# Patient Record
Sex: FEMALE | Race: WHITE | Employment: OTHER | URBAN - NONMETROPOLITAN AREA
[De-identification: names, ages, dates, MRNs, and addresses within clinical notes are randomized per-mention and may not be internally consistent; named-entity substitution may affect disease eponyms.]

---

## 2017-03-02 ENCOUNTER — HOSPITAL ENCOUNTER (EMERGENCY)
Facility: HOSPITAL | Age: 67
Discharge: HOME | End: 2017-03-02
Attending: FAMILY MEDICINE | Admitting: FAMILY MEDICINE
Payer: COMMERCIAL

## 2017-03-02 VITALS
SYSTOLIC BLOOD PRESSURE: 124 MMHG | HEART RATE: 62 BPM | TEMPERATURE: 97.5 F | DIASTOLIC BLOOD PRESSURE: 81 MMHG | OXYGEN SATURATION: 95 % | RESPIRATION RATE: 16 BRPM

## 2017-03-02 DIAGNOSIS — S73.101A HIP SPRAIN, RIGHT, INITIAL ENCOUNTER: ICD-10-CM

## 2017-03-02 PROCEDURE — 99283 EMERGENCY DEPT VISIT LOW MDM: CPT

## 2017-03-02 PROCEDURE — 73502 X-RAY EXAM HIP UNI 2-3 VIEWS: CPT | Mod: TC,RT

## 2017-03-02 PROCEDURE — 99283 EMERGENCY DEPT VISIT LOW MDM: CPT | Performed by: FAMILY MEDICINE

## 2017-03-02 NOTE — ED PROVIDER NOTES
History     Chief Complaint   Patient presents with     Hip Pain     right hip     HPI  Nishi Sanotyo is a 66 year old female who was getting ready for the day and twisted on her right hip.  She felt a popping sensation and the hip became painful immediately, she had increased difficulty with ambulation.  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  She has PCA assistance currently.    I have reviewed the Medications, Allergies, Past Medical and Surgical History, and Social History in the Epic system.    Review of Systems   Constitutional: Positive for activity change. Negative for fatigue.   HENT: Negative.         Did not fall or injure anything but hip   Respiratory: Negative for shortness of breath.    Cardiovascular: Negative for chest pain.   Gastrointestinal: Negative for abdominal distention and abdominal pain.   Genitourinary: Negative.    Musculoskeletal: Positive for arthralgias.        See HPI.   Skin: Negative for color change and wound.   Psychiatric/Behavioral: Negative.        Physical Exam   BP: 127/87  Pulse: 62  Heart Rate: 61  Temp: 98.7  F (37.1  C)  Resp: 18  SpO2: 96 %  Physical Exam   Constitutional: She is oriented to person, place, and time. She appears well-developed and well-nourished. No distress.   HENT:   Head: Normocephalic and atraumatic.   Neck: Normal range of motion. Neck supple.   Cardiovascular: Normal rate, regular rhythm, normal heart sounds and intact distal pulses.    No murmur heard.  Pulmonary/Chest: Effort normal and breath sounds normal. No respiratory distress.   Abdominal: Soft. Bowel sounds are normal. She exhibits no distension. There is no tenderness.   Musculoskeletal: Normal range of motion. She exhibits tenderness. She exhibits no edema or deformity.   Pain in groin area on right side.   Neurological: She is alert and oriented to person, place, and  time.   Skin: Skin is warm and dry.   Psychiatric: She has a normal mood and affect.   Nursing note and vitals reviewed.      ED Course     ED Course     Procedures      Labs Ordered and Resulted from Time of ED Arrival Up to the Time of Departure from the ED - No data to display    Assessments & Plan (with Medical Decision Making)   Patient has pain in groin, but no evidence of fracture on xray.  Ambulated in ED, has an unsteady gait usually due to her RA, and has walker at home.  Recommended she use it until the pain improves.  She stated she has pain meds at home, does not need anything else.  Follow up with Dr. Heart as needed.    I have reviewed the nursing notes.    I have reviewed the findings, diagnosis, plan and need for follow up with the patient.    There are no discharge medications for this patient.      Final diagnoses:   Hip sprain, right, initial encounter       3/2/2017   [facility]     Monique Lala MD  03/03/17 3830

## 2017-03-02 NOTE — ED NOTES
discharge instructions reviewed with patient. Encouraged to return with new or worsening symptoms.  No questions or concerns. Taxi called for patient to transport home

## 2017-03-02 NOTE — ED NOTES
"Pt presents in [facility] ambulance with complaint of right sided hip pain that began this morning. The patient was taking a step with her walker when she felt it \"pop.\" She states she could not move from there. External rotation noted. Patient rates the pain 10/10 with any movement, but does not have pain at rest. Patient does have history of RA and reports having many surgeries. Call light within reach.    "

## 2017-03-02 NOTE — ED AVS SNAPSHOT
[facility]    [address] [address]    Phone:  [phone]                                       Nishi Santoyo   MRN: [id number]    Department:  [facility]   Date of Visit:  3/2/2017           Patient Information     Date Of Birth          1950        Your diagnoses for this visit were:     Hip sprain, right, initial encounter        You were seen by Monique Lala MD.      Follow-up Information     Follow up with Hua Heart DO In 4 days.    Specialty:  Family Practice    Why:  As needed, If symptoms worsen or fail to improve    Contact information:    [facility]  [address] [facility] [id number] [phone]          Discharge Instructions         Hip Strain    You have a strain of the muscles around the hip joint. A muscle strain is a stretching or tearing of muscle fibers. This causes pain, especially when you move that muscle. There may also be some swelling and bruising.  Home care    Stay off the injured leg as much as possible until you can walk on it without pain. If you have a lot of pain with walking, crutches or a walker may be prescribed. These can be rented or purchased at many pharmacies and surgical or orthopedic supply stores. Follow your healthcare provider's advice regarding when to begin putting weight on that leg.    Apply an ice pack over the injured area for 15 to 20 minutes every 3 to 6 hours. You should do this for the first 24 to 48 hours. You can make an ice pack by filling a plastic bag that seals at the top with ice cubes and then wrapping it with a thin towel. Be careful not to injure your skin with the ice treatments. Ice should never be applied directly to skin. Continue the use of ice packs for relief of pain and swelling as needed. After 48 hours, apply heat (warm shower or warm bath) for 15 to 20 minutes several times a day, or alternate ice and heat.    You may use over-the-counter  pain medicine to control pain, unless another pain medicine was prescribed. If you have chronic liver or kidney disease or ever had a stomach ulcer or GI bleeding, talk with your healthcare provider before using these medicines.    If you play sports, you may resume these activities when you are able to hop and run on the injured leg without pain.  Follow-up care  Follow up with your healthcare provider, or as advised. If your symptoms do not begin to get better after a week, more tests may be needed.  If X-rays were taken, you will be told of any new findings that may affect your care.  When to seek medical advice  Call your healthcare provider right away if any of these occur:    Increased swelling or  bruising    Increased pain    Losing the ability to put weight on the injured side    0398-5486 [employer]. [address] [place] [address]. All rights reserved. This information is not intended as a substitute for professional medical care. Always follow your healthcare professional's instructions.             Review of your medicines      Notice     You have not been prescribed any medications.            Procedures and tests performed during your visit     XR Hip Right 2-3 Views      Orders Needing Specimen Collection     None      Pending Results     Date and Time Order Name Status Description    3/2/2017 1110 XR Hip Right 2-3 Views Preliminary             Pending Culture Results     No orders found from 2/28/2017 to 3/3/2017.            Thank you for choosing [facility]       Thank you for choosing [facility] for your care. Our goal is always to provide you with excellent care. Hearing back from our patients is one way we can continue to improve our services. Please take a few minutes to complete the written survey that you may receive in the mail after you visit with us. Thank you!        [employer] Information     [employer] lets you send messages to your doctor, view your test results, renew  "your prescriptions, schedule appointments and more. To sign up, go to [email or web address] . Click on \"Log in\" on the left side of the screen, which will take you to the Welcome page. Then click on \"Sign up Now\" on the right side of the page.     You will be asked to enter the access code listed below, as well as some personal information. Please follow the directions to create your username and password.     Your access code is: [email or web address]  Expires: 2017  1:11 PM     Your access code will  in 90 days. If you need help or a new code, please call your [place] clinic or [phone].        Care EveryWhere ID     This is your Care EveryWhere ID. This could be used by other organizations to access your [place] medical records  [id number]        After Visit Summary       This is your record. Keep this with you and show to your community pharmacist(s) and doctor(s) at your next visit.                  "

## 2017-03-02 NOTE — DISCHARGE INSTRUCTIONS
Hip Strain    You have a strain of the muscles around the hip joint. A muscle strain is a stretching or tearing of muscle fibers. This causes pain, especially when you move that muscle. There may also be some swelling and bruising.  Home care    Stay off the injured leg as much as possible until you can walk on it without pain. If you have a lot of pain with walking, crutches or a walker may be prescribed. These can be rented or purchased at many pharmacies and surgical or orthopedic supply stores. Follow your healthcare provider's advice regarding when to begin putting weight on that leg.    Apply an ice pack over the injured area for 15 to 20 minutes every 3 to 6 hours. You should do this for the first 24 to 48 hours. You can make an ice pack by filling a plastic bag that seals at the top with ice cubes and then wrapping it with a thin towel. Be careful not to injure your skin with the ice treatments. Ice should never be applied directly to skin. Continue the use of ice packs for relief of pain and swelling as needed. After 48 hours, apply heat (warm shower or warm bath) for 15 to 20 minutes several times a day, or alternate ice and heat.    You may use over-the-counter pain medicine to control pain, unless another pain medicine was prescribed. If you have chronic liver or kidney disease or ever had a stomach ulcer or GI bleeding, talk with your healthcare provider before using these medicines.    If you play sports, you may resume these activities when you are able to hop and run on the injured leg without pain.  Follow-up care  Follow up with your healthcare provider, or as advised. If your symptoms do not begin to get better after a week, more tests may be needed.  If X-rays were taken, you will be told of any new findings that may affect your care.  When to seek medical advice  Call your healthcare provider right away if any of these occur:    Increased swelling or  bruising    Increased pain    Losing the  ability to put weight on the injured side    1605-0636 [employer]. [address] [place] [address], [place] [address]. All rights reserved. This information is not intended as a substitute for professional medical care. Always follow your healthcare professional's instructions.

## 2017-03-02 NOTE — ED AVS SNAPSHOT
[facility]    [address] [place] [address]    Phone:  [phone]                                       Nishi Santoyo   MRN: [id number]    Department:  [facility]   Date of Visit:  3/2/2017           After Visit Summary Signature Page     I have received my discharge instructions, and my questions have been answered. I have discussed any challenges I see with this plan with the nurse or doctor.    ..........................................................................................................................................  Patient/Patient Representative Signature      ..........................................................................................................................................  Patient Representative Print Name and Relationship to Patient    ..................................................               ................................................  Date                                            Time    ..........................................................................................................................................  Reviewed by Signature/Title    ...................................................              ..............................................  Date                                                            Time

## 2017-03-03 ASSESSMENT — ENCOUNTER SYMPTOMS
ARTHRALGIAS: 1
SHORTNESS OF BREATH: 0
COLOR CHANGE: 0
ABDOMINAL DISTENTION: 0
PSYCHIATRIC NEGATIVE: 1
ACTIVITY CHANGE: 1
FATIGUE: 0
WOUND: 0
ABDOMINAL PAIN: 0

## 2017-06-15 DIAGNOSIS — Z12.31 VISIT FOR SCREENING MAMMOGRAM: Primary | ICD-10-CM

## 2018-03-16 ENCOUNTER — HOSPITAL ENCOUNTER (OUTPATIENT)
Dept: BONE DENSITY | Facility: HOSPITAL | Age: 68
End: 2018-03-16
Attending: FAMILY MEDICINE
Payer: COMMERCIAL

## 2018-03-16 DIAGNOSIS — M81.0 POSTMENOPAUSAL OSTEOPOROSIS: ICD-10-CM

## 2018-03-16 PROCEDURE — 77081 DXA BONE DENSITY APPENDICULR: CPT | Mod: TC

## 2018-05-01 ENCOUNTER — HOSPITAL ENCOUNTER (OUTPATIENT)
Dept: MRI IMAGING | Facility: HOSPITAL | Age: 68
Discharge: HOME | End: 2018-05-01
Attending: FAMILY MEDICINE | Admitting: FAMILY MEDICINE
Payer: COMMERCIAL

## 2018-05-01 DIAGNOSIS — H53.9 VISUAL CHANGES: ICD-10-CM

## 2018-05-01 DIAGNOSIS — G89.29 CHRONIC NONINTRACTABLE HEADACHE, UNSPECIFIED HEADACHE TYPE: ICD-10-CM

## 2018-05-01 DIAGNOSIS — R51.9 CHRONIC NONINTRACTABLE HEADACHE, UNSPECIFIED HEADACHE TYPE: ICD-10-CM

## 2018-05-01 DIAGNOSIS — H53.8 VISUAL BLURRINESS: ICD-10-CM

## 2018-05-01 PROCEDURE — A9585 GADOBUTROL INJECTION: HCPCS | Performed by: RADIOLOGY

## 2018-05-01 PROCEDURE — 70553 MRI BRAIN STEM W/O & W/DYE: CPT | Mod: TC

## 2018-05-01 PROCEDURE — 25000128 H RX IP 250 OP 636: Performed by: RADIOLOGY

## 2018-05-01 RX ORDER — GADOBUTROL 604.72 MG/ML
10 INJECTION INTRAVENOUS ONCE
Status: COMPLETED | OUTPATIENT
Start: 2018-05-01 | End: 2018-05-01

## 2018-05-01 RX ADMIN — GADOBUTROL 10 ML: 604.72 INJECTION INTRAVENOUS at 11:41

## 2019-01-11 ENCOUNTER — TRANSFERRED RECORDS (OUTPATIENT)
Dept: HEALTH INFORMATION MANAGEMENT | Facility: HOSPITAL | Age: 69
End: 2019-01-11

## 2019-01-11 ENCOUNTER — HOSPITAL ENCOUNTER (OUTPATIENT)
Facility: HOSPITAL | Age: 69
End: 2019-01-11
Attending: OPHTHALMOLOGY | Admitting: OPHTHALMOLOGY
Payer: COMMERCIAL

## 2019-01-11 LAB
ALT SERPL-CCNC: 43 U/L (ref 18–65)
AST SERPL-CCNC: 44 U/L (ref 10–30)
CREAT SERPL-MCNC: 0.8 MG/DL (ref 0.7–1.2)
GFR SERPL CREATININE-BSD FRML MDRD: >60 ML/MIN/1.73M2
GLUCOSE SERPL-MCNC: 118 MG/DL (ref 60–99)
POTASSIUM SERPL-SCNC: 3.4 MEQ/L (ref 3.5–5.1)

## 2019-01-12 ENCOUNTER — TRANSFERRED RECORDS (OUTPATIENT)
Dept: HEALTH INFORMATION MANAGEMENT | Facility: HOSPITAL | Age: 69
End: 2019-01-12

## 2019-01-17 ENCOUNTER — TRANSFERRED RECORDS (OUTPATIENT)
Dept: HEALTH INFORMATION MANAGEMENT | Facility: HOSPITAL | Age: 69
End: 2019-01-17

## 2019-01-31 RX ORDER — ATORVASTATIN CALCIUM 20 MG/1
20 TABLET, FILM COATED ORAL DAILY
COMMUNITY
End: 2019-02-07 | Stop reason: HOSPADM

## 2019-01-31 RX ORDER — MUPIROCIN 20 MG/G
OINTMENT TOPICAL 2 TIMES DAILY
COMMUNITY
End: 2019-02-07 | Stop reason: HOSPADM

## 2019-01-31 RX ORDER — ACYCLOVIR 50 MG/G
OINTMENT TOPICAL
COMMUNITY
End: 2019-02-07 | Stop reason: HOSPADM

## 2019-01-31 RX ORDER — METOPROLOL SUCCINATE 50 MG/1
50 TABLET, EXTENDED RELEASE ORAL DAILY
COMMUNITY
End: 2019-02-07 | Stop reason: HOSPADM

## 2019-01-31 RX ORDER — OMEPRAZOLE 40 MG/1
40 CAPSULE, DELAYED RELEASE ORAL DAILY
COMMUNITY
End: 2019-02-07 | Stop reason: HOSPADM

## 2019-01-31 RX ORDER — SOLIFENACIN SUCCINATE 5 MG/1
5 TABLET, FILM COATED ORAL DAILY
COMMUNITY
End: 2019-02-07 | Stop reason: HOSPADM

## 2019-01-31 RX ORDER — NYSTATIN 100000/ML
500000 SUSPENSION, ORAL (FINAL DOSE FORM) ORAL 2 TIMES DAILY
COMMUNITY
End: 2019-02-07 | Stop reason: HOSPADM

## 2019-01-31 RX ORDER — ACETAMINOPHEN 160 MG
2 TABLET,DISINTEGRATING ORAL DAILY
COMMUNITY
End: 2019-02-07 | Stop reason: HOSPADM

## 2019-01-31 RX ORDER — PROCHLORPERAZINE MALEATE 10 MG
10 TABLET ORAL EVERY 6 HOURS PRN
COMMUNITY
End: 2019-02-07 | Stop reason: HOSPADM

## 2019-01-31 RX ORDER — MOMETASONE FUROATE 1 MG/G
CREAM TOPICAL 2 TIMES DAILY
COMMUNITY
End: 2019-02-07 | Stop reason: HOSPADM

## 2019-01-31 RX ORDER — NYSTATIN 100000 [USP'U]/G
POWDER TOPICAL 2 TIMES DAILY
COMMUNITY
End: 2019-02-07 | Stop reason: HOSPADM

## 2019-01-31 RX ORDER — SILVER SULFADIAZINE 10 MG/G
CREAM TOPICAL DAILY
COMMUNITY
End: 2019-02-07 | Stop reason: HOSPADM

## 2019-01-31 RX ORDER — HYDROCHLOROTHIAZIDE 12.5 MG/1
12.5 TABLET ORAL DAILY
COMMUNITY
End: 2019-02-07 | Stop reason: HOSPADM

## 2019-01-31 RX ORDER — SUMATRIPTAN 100 MG/1
100 TABLET, FILM COATED ORAL
COMMUNITY
End: 2019-02-07 | Stop reason: HOSPADM

## 2019-01-31 RX ORDER — LEVOTHYROXINE SODIUM 50 UG/1
50 TABLET ORAL DAILY
COMMUNITY
End: 2019-02-07 | Stop reason: HOSPADM

## 2019-01-31 RX ORDER — OXYCODONE HCL 10 MG/1
10 TABLET, FILM COATED, EXTENDED RELEASE ORAL EVERY 12 HOURS
COMMUNITY
End: 2019-02-07 | Stop reason: HOSPADM

## 2019-01-31 RX ORDER — ALPRAZOLAM 1 MG
1 TABLET ORAL
COMMUNITY
End: 2019-02-07 | Stop reason: HOSPADM

## 2019-01-31 RX ORDER — TOLTERODINE 4 MG/1
CAPSULE, EXTENDED RELEASE ORAL DAILY
COMMUNITY
End: 2019-02-07 | Stop reason: HOSPADM

## 2019-02-06 ENCOUNTER — ANESTHESIA EVENT (OUTPATIENT)
Dept: SURGERY | Facility: HOSPITAL | Age: 69
End: 2019-02-06

## 2019-02-06 RX ORDER — PROPARACAINE HYDROCHLORIDE 5 MG/ML
1 SOLUTION/ DROPS OPHTHALMIC ONCE
Status: CANCELLED | OUTPATIENT
Start: 2019-02-06 | End: 2019-02-06

## 2019-02-06 RX ORDER — ONDANSETRON 4 MG/1
4 TABLET, ORALLY DISINTEGRATING ORAL EVERY 30 MIN PRN
Status: CANCELLED | OUTPATIENT
Start: 2019-02-06

## 2019-02-06 RX ORDER — NALOXONE HYDROCHLORIDE 0.4 MG/ML
.1-.4 INJECTION, SOLUTION INTRAMUSCULAR; INTRAVENOUS; SUBCUTANEOUS
Status: CANCELLED | OUTPATIENT
Start: 2019-02-06 | End: 2019-02-07

## 2019-02-06 RX ORDER — LIDOCAINE 40 MG/G
CREAM TOPICAL
Status: CANCELLED | OUTPATIENT
Start: 2019-02-06

## 2019-02-06 RX ORDER — CYCLOPENTOLATE HYDROCHLORIDE 10 MG/ML
1 SOLUTION/ DROPS OPHTHALMIC
Status: CANCELLED | OUTPATIENT
Start: 2019-02-06

## 2019-02-06 RX ORDER — OFLOXACIN 3 MG/ML
1 SOLUTION/ DROPS OPHTHALMIC ONCE
Status: CANCELLED | OUTPATIENT
Start: 2019-02-06 | End: 2019-02-06

## 2019-02-06 RX ORDER — MEPERIDINE HYDROCHLORIDE 50 MG/ML
12.5 INJECTION INTRAMUSCULAR; INTRAVENOUS; SUBCUTANEOUS
Status: CANCELLED | OUTPATIENT
Start: 2019-02-06

## 2019-02-06 RX ORDER — PHENYLEPHRINE HYDROCHLORIDE 25 MG/ML
1 SOLUTION/ DROPS OPHTHALMIC
Status: CANCELLED | OUTPATIENT
Start: 2019-02-06

## 2019-02-06 RX ORDER — ONDANSETRON 2 MG/ML
4 INJECTION INTRAMUSCULAR; INTRAVENOUS EVERY 30 MIN PRN
Status: CANCELLED | OUTPATIENT
Start: 2019-02-06

## 2019-02-06 RX ORDER — SODIUM CHLORIDE, SODIUM LACTATE, POTASSIUM CHLORIDE, CALCIUM CHLORIDE 600; 310; 30; 20 MG/100ML; MG/100ML; MG/100ML; MG/100ML
INJECTION, SOLUTION INTRAVENOUS CONTINUOUS
Status: CANCELLED | OUTPATIENT
Start: 2019-02-06

## 2019-02-06 NOTE — ANESTHESIA PREPROCEDURE EVALUATION
Anesthesia Pre-Procedure Evaluation    Patient: Nishi Santoyo   MRN: [id number] : 1950          Preoperative Diagnosis: CATARACT LEFT EYE    Procedure(s):  CATARACT EXTRACTION WITH IMPLANT LEFT    Past Medical History:   Diagnosis Date     Arthritis     RA, osteoarthritis     Diabetes (H)      Hypertension      Obese      Renal disease      Past Surgical History:   Procedure Laterality Date     BACK SURGERY  2014    spine fusion surgery     BREAST SURGERY  1980    bilateral breast reductions     CHOLECYSTECTOMY       COLONOSCOPY  2017     ORTHOPEDIC SURGERY      left total hip     ORTHOPEDIC SURGERY      right total knee     ORTHOPEDIC SURGERY      right total shoulder     ORTHOPEDIC SURGERY  2006    finger and hand joint replacements       Anesthesia Evaluation     .             ROS/MED HX    ENT/Pulmonary:       Neurologic:     (+)migraines,     Cardiovascular:     (+) hypertension----. : . . . :. . Previous cardiac testing date:results:date: results:ECG reviewed date:19 results:Nsr, moderate left axis deviation, t wave abnormality. date: results:          METS/Exercise Tolerance:     Hematologic:         Musculoskeletal:   (+) arthritis, , , -       GI/Hepatic:         Renal/Genitourinary:     (+) chronic renal disease,       Endo:     (+) type II DM thyroid problem Obesity, .      Psychiatric:     (+) psychiatric history depression      Infectious Disease:         Malignancy:         Other:                                 Lab Results   Component Value Date    POTASSIUM 3.4 (L) 2019    CR 0.80 2019     (H) 2019    ALT 43 2019    AST 44 (H) 2019    JAY 21 2014       Preop Vitals  BP Readings from Last 3 Encounters:   17 124/81    Pulse Readings from Last 3 Encounters:   17 62      Resp Readings from Last 3 Encounters:   17 16    SpO2 Readings from Last 3 Encounters:   17 95%      Temp Readings from Last 1  Encounters:   03/02/17 97.5  F (36.4  C) (Oral)    Ht Readings from Last 1 Encounters:   No data found for Ht      Wt Readings from Last 1 Encounters:   No data found for Wt    There is no height or weight on file to calculate BMI.       Anesthesia Plan      History & Physical Review      ASA Status:  3 .        Plan for     H/p 2/1/19      Postoperative Care      Consents                 LAVERNE Mandujano CRNA

## 2019-02-07 ENCOUNTER — ANESTHESIA (OUTPATIENT)
Dept: SURGERY | Facility: HOSPITAL | Age: 69
End: 2019-02-07

## 2019-02-28 RX ORDER — HYDROCHLOROTHIAZIDE 12.5 MG/1
25 TABLET ORAL DAILY
Status: ON HOLD | COMMUNITY
End: 2024-10-02

## 2019-02-28 RX ORDER — PROCHLORPERAZINE MALEATE 10 MG
10 TABLET ORAL 2 TIMES DAILY PRN
Status: ON HOLD | COMMUNITY

## 2019-02-28 RX ORDER — SUMATRIPTAN 100 MG/1
100 TABLET, FILM COATED ORAL
Status: ON HOLD | COMMUNITY

## 2019-02-28 RX ORDER — ALPRAZOLAM 1 MG
1 TABLET ORAL 3 TIMES DAILY PRN
Status: ON HOLD | COMMUNITY
End: 2024-10-02

## 2019-02-28 RX ORDER — MOMETASONE FUROATE 1 MG/G
CREAM TOPICAL 2 TIMES DAILY PRN
Status: ON HOLD | COMMUNITY
End: 2024-10-02

## 2019-02-28 RX ORDER — ATORVASTATIN CALCIUM 20 MG/1
20 TABLET, FILM COATED ORAL EVERY EVENING
Status: ON HOLD | COMMUNITY

## 2019-02-28 RX ORDER — SOLIFENACIN SUCCINATE 5 MG/1
5 TABLET, FILM COATED ORAL DAILY
Status: ON HOLD | COMMUNITY

## 2019-02-28 RX ORDER — MUPIROCIN 20 MG/G
OINTMENT TOPICAL 2 TIMES DAILY PRN
Status: ON HOLD | COMMUNITY
End: 2024-10-02

## 2019-02-28 RX ORDER — OMEPRAZOLE 40 MG/1
40 CAPSULE, DELAYED RELEASE ORAL DAILY
Status: ON HOLD | COMMUNITY
End: 2024-10-02

## 2019-02-28 RX ORDER — NYSTATIN 100000 [USP'U]/G
POWDER TOPICAL
Status: ON HOLD | COMMUNITY
End: 2024-10-02

## 2019-02-28 RX ORDER — ACETAMINOPHEN 160 MG
2 TABLET,DISINTEGRATING ORAL DAILY
Status: ON HOLD | COMMUNITY
End: 2024-10-02

## 2019-02-28 RX ORDER — METOPROLOL SUCCINATE 50 MG/1
50 TABLET, EXTENDED RELEASE ORAL DAILY
Status: ON HOLD | COMMUNITY

## 2019-02-28 RX ORDER — LEVOTHYROXINE SODIUM 50 UG/1
50 TABLET ORAL EVERY MORNING
Status: ON HOLD | COMMUNITY

## 2019-02-28 RX ORDER — OXYCODONE HCL 10 MG/1
10 TABLET, FILM COATED, EXTENDED RELEASE ORAL EVERY 12 HOURS
Status: ON HOLD | COMMUNITY
End: 2024-10-02

## 2019-03-01 ENCOUNTER — TRANSFERRED RECORDS (OUTPATIENT)
Dept: HEALTH INFORMATION MANAGEMENT | Facility: HOSPITAL | Age: 69
End: 2019-03-01

## 2019-03-01 LAB
CREAT SERPL-MCNC: 0.82 MG/DL (ref 0.7–1.2)
GFR SERPL CREATININE-BSD FRML MDRD: >60 ML/MIN/1.73M2
GLUCOSE SERPL-MCNC: 98 MG/DL (ref 60–99)
POTASSIUM SERPL-SCNC: 4 MEQ/L (ref 3.5–5.1)

## 2019-03-06 ENCOUNTER — ANESTHESIA EVENT (OUTPATIENT)
Dept: SURGERY | Facility: HOSPITAL | Age: 69
End: 2019-03-06
Payer: COMMERCIAL

## 2019-03-06 NOTE — ANESTHESIA PREPROCEDURE EVALUATION
Anesthesia Pre-Procedure Evaluation    Patient: Nishi Santoyo   MRN: [id number] : 1950          Preoperative Diagnosis: CATARACT LEFT EYE    Procedure(s):  CATARACT EXTRACTION WITH IMPLANT LEFT    Past Medical History:   Diagnosis Date     Arthritis     RA, osteoarthritis     Diabetes (H)      Hypertension      Obese      Renal disease      Past Surgical History:   Procedure Laterality Date     BACK SURGERY  2014    spine fusion surgery     BREAST SURGERY  1980    bilateral breast reductions     CHOLECYSTECTOMY       COLONOSCOPY  2017     ORTHOPEDIC SURGERY      left total hip     ORTHOPEDIC SURGERY      right total knee     ORTHOPEDIC SURGERY      right total shoulder     ORTHOPEDIC SURGERY      finger and hand joint replacements       Anesthesia Evaluation     . Pt has had prior anesthetic.     No history of anesthetic complications          ROS/MED HX    ENT/Pulmonary:     (+)JUAN ALBERTO risk factors obese, tobacco use, Current use <0.5 PPD packs/day  , . .    Neurologic:     (+)migraines,     Cardiovascular:     (+) Dyslipidemia, hypertension----. : . . . :. .       METS/Exercise Tolerance:     Hematologic:  - neg hematologic  ROS       Musculoskeletal:   (+) arthritis, , , other musculoskeletal- DJD      GI/Hepatic:  - neg GI/hepatic ROS       Renal/Genitourinary:     (+) chronic renal disease, type: CRI,       Endo:     (+) type II DM Obesity, .      Psychiatric:     (+) psychiatric history depression      Infectious Disease:  - neg infectious disease ROS       Malignancy:      - no malignancy   Other:    - neg other ROS                      Physical Exam  Normal systems: cardiovascular and pulmonary    Airway   Mallampati: III  TM distance: >3 FB  Neck ROM: full    Dental   (+) upper dentures and missing    Cardiovascular   Rhythm and rate: regular and normal      Pulmonary    breath sounds clear to auscultation            Lab Results   Component Value Date    POTASSIUM 3.4 (L)  01/11/2019    CR 0.80 01/11/2019     (H) 01/11/2019    ALT 43 01/11/2019    AST 44 (H) 01/11/2019    JAY 21 07/09/2014       Preop Vitals  BP Readings from Last 3 Encounters:   03/02/17 124/81    Pulse Readings from Last 3 Encounters:   03/02/17 62      Resp Readings from Last 3 Encounters:   03/02/17 16    SpO2 Readings from Last 3 Encounters:   03/02/17 95%      Temp Readings from Last 1 Encounters:   03/02/17 97.5  F (36.4  C) (Oral)    Ht Readings from Last 1 Encounters:   No data found for Ht      Wt Readings from Last 1 Encounters:   No data found for Wt    There is no height or weight on file to calculate BMI.       Anesthesia Plan      History & Physical Review  History and physical reviewed and following examination; no interval change.    ASA Status:  3 .    NPO Status:  > 8 hours    Plan for MAC with Other induction. Maintenance will be Other.  Reason for MAC:  Chronic cardiopulmonary disease (G9), Deep or markedly invasive procedure (G8) and Procedure to face, neck, head or breast  PONV prophylaxis:  Ondansetron (or other 5HT-3)       Postoperative Care  Postoperative pain management:  Oral pain medications.      Consents  Anesthetic plan, risks, benefits and alternatives discussed with:  Patient..                 Mark Stubbs MD

## 2019-03-07 ENCOUNTER — ANESTHESIA (OUTPATIENT)
Dept: SURGERY | Facility: HOSPITAL | Age: 69
End: 2019-03-07
Payer: COMMERCIAL

## 2019-03-07 ENCOUNTER — HOSPITAL ENCOUNTER (OUTPATIENT)
Facility: HOSPITAL | Age: 69
Discharge: HOME | End: 2019-03-07
Attending: OPHTHALMOLOGY | Admitting: OPHTHALMOLOGY
Payer: COMMERCIAL

## 2019-03-07 VITALS
TEMPERATURE: 97 F | DIASTOLIC BLOOD PRESSURE: 94 MMHG | HEART RATE: 74 BPM | BODY MASS INDEX: 48.82 KG/M2 | SYSTOLIC BLOOD PRESSURE: 111 MMHG | OXYGEN SATURATION: 94 % | WEIGHT: 293 LBS | RESPIRATION RATE: 18 BRPM | HEIGHT: 65 IN

## 2019-03-07 PROCEDURE — 66984 XCAPSL CTRC RMVL W/O ECP: CPT | Performed by: NURSE ANESTHETIST, CERTIFIED REGISTERED

## 2019-03-07 PROCEDURE — 66984 XCAPSL CTRC RMVL W/O ECP: CPT | Performed by: ANESTHESIOLOGY

## 2019-03-07 PROCEDURE — C9447 INJ, PHENYLEPHRINE KETOROLAC: HCPCS | Performed by: OPHTHALMOLOGY

## 2019-03-07 PROCEDURE — 25000128 H RX IP 250 OP 636: Performed by: NURSE ANESTHETIST, CERTIFIED REGISTERED

## 2019-03-07 PROCEDURE — 36000056 ZZH SURGERY LEVEL 3 1ST 30 MIN: Performed by: OPHTHALMOLOGY

## 2019-03-07 PROCEDURE — 37000009 ZZH ANESTHESIA TECHNICAL FEE, EACH ADDTL 15 MIN: Performed by: OPHTHALMOLOGY

## 2019-03-07 PROCEDURE — 37000008 ZZH ANESTHESIA TECHNICAL FEE, 1ST 30 MIN: Performed by: OPHTHALMOLOGY

## 2019-03-07 PROCEDURE — 71000027 ZZH RECOVERY PHASE 2 EACH 15 MINS: Performed by: OPHTHALMOLOGY

## 2019-03-07 PROCEDURE — 27210794 ZZH OR GENERAL SUPPLY STERILE: Performed by: OPHTHALMOLOGY

## 2019-03-07 PROCEDURE — 25000125 ZZHC RX 250: Performed by: NURSE ANESTHETIST, CERTIFIED REGISTERED

## 2019-03-07 PROCEDURE — V2632 POST CHMBR INTRAOCULAR LENS: HCPCS | Performed by: OPHTHALMOLOGY

## 2019-03-07 PROCEDURE — 25000128 H RX IP 250 OP 636: Performed by: OPHTHALMOLOGY

## 2019-03-07 PROCEDURE — 25000125 ZZHC RX 250: Performed by: OPHTHALMOLOGY

## 2019-03-07 PROCEDURE — 40000305 ZZH STATISTIC PRE PROC ASSESS I: Performed by: OPHTHALMOLOGY

## 2019-03-07 DEVICE — IMPLANTABLE DEVICE: Type: IMPLANTABLE DEVICE | Site: EYE | Status: FUNCTIONAL

## 2019-03-07 RX ORDER — MEPERIDINE HYDROCHLORIDE 50 MG/ML
12.5 INJECTION INTRAMUSCULAR; INTRAVENOUS; SUBCUTANEOUS
Status: DISCONTINUED | OUTPATIENT
Start: 2019-03-07 | End: 2019-03-07 | Stop reason: HOSPADM

## 2019-03-07 RX ORDER — METOPROLOL TARTRATE 1 MG/ML
1-2 INJECTION, SOLUTION INTRAVENOUS EVERY 5 MIN PRN
Status: DISCONTINUED | OUTPATIENT
Start: 2019-03-07 | End: 2019-03-07 | Stop reason: HOSPADM

## 2019-03-07 RX ORDER — CYCLOPENTOLATE HYDROCHLORIDE 10 MG/ML
1 SOLUTION/ DROPS OPHTHALMIC
Status: DISCONTINUED | OUTPATIENT
Start: 2019-03-07 | End: 2019-03-07 | Stop reason: HOSPADM

## 2019-03-07 RX ORDER — PREDNISOLONE ACETATE 10 MG/ML
SUSPENSION/ DROPS OPHTHALMIC PRN
Status: DISCONTINUED | OUTPATIENT
Start: 2019-03-07 | End: 2019-03-07 | Stop reason: HOSPADM

## 2019-03-07 RX ORDER — FENTANYL CITRATE 50 UG/ML
25-50 INJECTION, SOLUTION INTRAMUSCULAR; INTRAVENOUS
Status: DISCONTINUED | OUTPATIENT
Start: 2019-03-07 | End: 2019-03-07 | Stop reason: HOSPADM

## 2019-03-07 RX ORDER — TETRACAINE HYDROCHLORIDE 5 MG/ML
SOLUTION OPHTHALMIC PRN
Status: DISCONTINUED | OUTPATIENT
Start: 2019-03-07 | End: 2019-03-07 | Stop reason: HOSPADM

## 2019-03-07 RX ORDER — ALBUTEROL SULFATE 0.83 MG/ML
2.5 SOLUTION RESPIRATORY (INHALATION) EVERY 4 HOURS PRN
Status: DISCONTINUED | OUTPATIENT
Start: 2019-03-07 | End: 2019-03-07 | Stop reason: HOSPADM

## 2019-03-07 RX ORDER — PROPARACAINE HYDROCHLORIDE 5 MG/ML
1 SOLUTION/ DROPS OPHTHALMIC ONCE
Status: COMPLETED | OUTPATIENT
Start: 2019-03-07 | End: 2019-03-07

## 2019-03-07 RX ORDER — ONDANSETRON 4 MG/1
4 TABLET, ORALLY DISINTEGRATING ORAL EVERY 30 MIN PRN
Status: DISCONTINUED | OUTPATIENT
Start: 2019-03-07 | End: 2019-03-07 | Stop reason: HOSPADM

## 2019-03-07 RX ORDER — KETAMINE HYDROCHLORIDE 10 MG/ML
INJECTION INTRAMUSCULAR; INTRAVENOUS PRN
Status: DISCONTINUED | OUTPATIENT
Start: 2019-03-07 | End: 2019-03-07

## 2019-03-07 RX ORDER — HYDRALAZINE HYDROCHLORIDE 20 MG/ML
2.5-5 INJECTION INTRAMUSCULAR; INTRAVENOUS EVERY 10 MIN PRN
Status: DISCONTINUED | OUTPATIENT
Start: 2019-03-07 | End: 2019-03-07 | Stop reason: HOSPADM

## 2019-03-07 RX ORDER — ONDANSETRON 2 MG/ML
4 INJECTION INTRAMUSCULAR; INTRAVENOUS EVERY 30 MIN PRN
Status: DISCONTINUED | OUTPATIENT
Start: 2019-03-07 | End: 2019-03-07 | Stop reason: HOSPADM

## 2019-03-07 RX ORDER — PROPARACAINE HYDROCHLORIDE 5 MG/ML
1 SOLUTION/ DROPS OPHTHALMIC ONCE
Status: DISCONTINUED | OUTPATIENT
Start: 2019-03-07 | End: 2019-03-07

## 2019-03-07 RX ORDER — OFLOXACIN 3 MG/ML
SOLUTION/ DROPS OPHTHALMIC PRN
Status: DISCONTINUED | OUTPATIENT
Start: 2019-03-07 | End: 2019-03-07 | Stop reason: HOSPADM

## 2019-03-07 RX ORDER — PHENYLEPHRINE HYDROCHLORIDE 25 MG/ML
1 SOLUTION/ DROPS OPHTHALMIC
Status: DISCONTINUED | OUTPATIENT
Start: 2019-03-07 | End: 2019-03-07 | Stop reason: HOSPADM

## 2019-03-07 RX ORDER — PHENYLEPHRINE HYDROCHLORIDE 25 MG/ML
1 SOLUTION/ DROPS OPHTHALMIC
Status: DISCONTINUED | OUTPATIENT
Start: 2019-03-07 | End: 2019-03-07

## 2019-03-07 RX ORDER — OFLOXACIN 3 MG/ML
1 SOLUTION/ DROPS OPHTHALMIC ONCE
Status: COMPLETED | OUTPATIENT
Start: 2019-03-07 | End: 2019-03-07

## 2019-03-07 RX ORDER — PHENYLEPHRINE HYDROCHLORIDE 100 MG/ML
1 SOLUTION/ DROPS OPHTHALMIC ONCE
Status: COMPLETED | OUTPATIENT
Start: 2019-03-07 | End: 2019-03-07

## 2019-03-07 RX ORDER — OFLOXACIN 3 MG/ML
1 SOLUTION/ DROPS OPHTHALMIC ONCE
Status: DISCONTINUED | OUTPATIENT
Start: 2019-03-07 | End: 2019-03-07

## 2019-03-07 RX ORDER — NALOXONE HYDROCHLORIDE 0.4 MG/ML
.1-.4 INJECTION, SOLUTION INTRAMUSCULAR; INTRAVENOUS; SUBCUTANEOUS
Status: DISCONTINUED | OUTPATIENT
Start: 2019-03-07 | End: 2019-03-07 | Stop reason: HOSPADM

## 2019-03-07 RX ORDER — CYCLOPENTOLATE HYDROCHLORIDE 10 MG/ML
1 SOLUTION/ DROPS OPHTHALMIC
Status: DISCONTINUED | OUTPATIENT
Start: 2019-03-07 | End: 2019-03-07

## 2019-03-07 RX ORDER — DEXAMETHASONE SODIUM PHOSPHATE 4 MG/ML
4 INJECTION, SOLUTION INTRA-ARTICULAR; INTRALESIONAL; INTRAMUSCULAR; INTRAVENOUS; SOFT TISSUE EVERY 10 MIN PRN
Status: DISCONTINUED | OUTPATIENT
Start: 2019-03-07 | End: 2019-03-07 | Stop reason: HOSPADM

## 2019-03-07 RX ADMIN — OFLOXACIN 1 DROP: 3 SOLUTION/ DROPS OPHTHALMIC at 08:50

## 2019-03-07 RX ADMIN — FLURBIPROFEN SODIUM 0.5 ML: 0.3 SOLUTION/ DROPS OPHTHALMIC at 08:51

## 2019-03-07 RX ADMIN — PROPARACAINE HYDROCHLORIDE 1 DROP: 5 SOLUTION/ DROPS OPHTHALMIC at 08:48

## 2019-03-07 RX ADMIN — CYCLOPENTOLATE HYDROCHLORIDE 1 DROP: 10 SOLUTION OPHTHALMIC at 08:48

## 2019-03-07 RX ADMIN — MIDAZOLAM 1 MG: 1 INJECTION INTRAMUSCULAR; INTRAVENOUS at 09:39

## 2019-03-07 RX ADMIN — PHENYLEPHRINE HYDROCHLORIDE 1 DROP: 100 SOLUTION/ DROPS OPHTHALMIC at 09:05

## 2019-03-07 RX ADMIN — MIDAZOLAM 1 MG: 1 INJECTION INTRAMUSCULAR; INTRAVENOUS at 09:45

## 2019-03-07 RX ADMIN — KETAMINE HYDROCHLORIDE 30 MG: 10 INJECTION, SOLUTION INTRAMUSCULAR; INTRAVENOUS at 09:55

## 2019-03-07 RX ADMIN — PHENYLEPHRINE HYDROCHLORIDE 1 DROP: 25 SOLUTION/ DROPS OPHTHALMIC at 08:50

## 2019-03-07 ASSESSMENT — MIFFLIN-ST. JEOR: SCORE: 1877.6

## 2019-03-07 ASSESSMENT — LIFESTYLE VARIABLES: TOBACCO_USE: 1

## 2019-03-07 NOTE — OR NURSING
Pateint discharged to home.  Cynthia score 20/20. Pain level 0/10.  Discharged from unit via w/c.

## 2019-03-07 NOTE — DISCHARGE INSTRUCTIONS
AFTER CATARACT SURGERY RESTRICTIONS    SHIELD: WEAR OVER SURGICAL EYE AT NIGHT FOR 1 WEEK    ACTIVITY/LIFTING: Avoid activities, which increase abdominal/head pressure such as pulling on a starter cord, heavy lifting (over 15-20 lbs) or bending with the head below the waist, for 1 week. Avoid sudden jerky movements (chopping, shoveling) for 1 week. Waking and lower body exercise such as biking is okay.     WATER: Showering/washing hair is okay the day after surgery, but avoid excess water, soap, or shampoo in your eyes. Clean eyelids gently with a clean, wet washcloth as needed. Avoid pressure on the eye.     SUNLIGHT: If the eye is light sensitive after surgery, dark glasses may be worn.     DIET/MEDICATIONS: Resume your usual diet and medications your family doctor has prescribed. Continue to use/follow the instructions for your eye drops.     DRIVING: Avoid driving with a patch. Resume driving when you feel safe, but don't drive on the day of surgery.    PAIN: Minor pain and itching is normal during healing. DO NOT RUB THE EYE! Report any unusual swelling, increased discharge or pain that is not relieved by Tylenol (or equivalent). If sudden drop/change in vision call immediately. [facility] [phone]    CONTINUE TO USE ALL THE EYE DROPS PER THE INSTRUCTIONS ORDERED BY DR. CASTRO'S TECHNICIANS    FOR EMERGENCY DR. MARTÍNEZ: [phone]    FOLLOW EYE DROP INSTRUCTIONS GIVEN BY 's OFFICE      Post-Anesthesia Patient Instructions    IMMEDIATELY FOLLOWING SURGERY:  Do not drive or operate machinery for the first twenty four hours after surgery.  Do not make any important decisions for twenty four hours after surgery or while taking narcotic pain medications or sedatives.  If you develop intractable nausea and vomiting or a severe headache please notify your doctor immediately.    FOLLOW-UP:  Please make an appointment with your surgeon as instructed. You do not need to follow up with anesthesia  unless specifically instructed to do so.    WOUND CARE INSTRUCTIONS (if applicable):  Keep a dry clean dressing on the anesthesia/puncture wound site if there is drainage.  Once the wound has quit draining you may leave it open to air.  Generally you should leave the bandage intact for twenty four hours unless there is drainage.  If the epidural site drains for more than 36-48 hours please call the anesthesia department.    QUESTIONS?:  Please feel free to call your physician or the hospital  if you have any questions, and they will be happy to assist you.

## 2019-03-07 NOTE — ANESTHESIA CARE TRANSFER NOTE
Patient: Nishi Santoyo    Procedure(s):  CATARACT EXTRACTION WITH IMPLANT LEFT    Diagnosis: CATARACT LEFT EYE  Diagnosis Additional Information: No value filed.    Anesthesia Type:   MAC     Note:  Airway :Room Air  Patient transferred to:Phase II  Handoff Report: Identifed the Patient, Identified the Reponsible Provider, Reviewed the pertinent medical history, Discussed the surgical course, Reviewed Intra-OP anesthesia mangement and issues during anesthesia, Set expectations for post-procedure period and Allowed opportunity for questions and acknowledgement of understanding      Vitals: (Last set prior to Anesthesia Care Transfer)    CRNA VITALS  3/7/2019 0939 - 3/7/2019 1015      3/7/2019             Resp Rate (set):  8                Electronically Signed By: LAVERNE Echeverria CRNA  March 7, 2019  10:15 AM

## 2019-03-07 NOTE — OP NOTE
DATE OF SERVICE: 3/7/19      PREOPERATIVE DIAGNOSIS:     Cataract, left eye.       POSTOPERATIVE DIAGNOSIS:  Cataract, left eye.       PROCEDURE:  Phacoemulsification with intraocular lens implant, Model PCB00, 22.5 diopter power.       ANESTHESIA:  Topical with IV sedation.         DESCRIPTION OF PROCEDURE:   Operative risks, benefits and alternatives to the procedure were discussed at length with the patient including loss of vision, loss of the eye.  Patient voiced understanding and informed consent was signed.  The patient was taken to the operating suite, where an appropriate level of anesthesia was given.  Attention was placed to the left eye.  The patient was then prepped and draped in the usual sterile ophthalmic manner.  A lid speculum was placed in the eye to provide exposure and MVR blade was used to make a paracentesis.  Once this was performed, Shugarcaine was then placed intracamerally.  This was then followed by intracameral Viscoat.  A 2.75 mm blade was then used to make a biplanar temporal clear corneal incision.  A cystotome and uttrata were used to make a continuous curvilinear capsulorrhexis.  BSS on Jackson cannula was then used to hydrodissect the lens.  Phacoemulsification was then performed in a divide-and-conquer technique to remove all pieces of the nucleus.  Irrigation aspiration was then used to remove all remaining cortical material and debris.  Amvisc was then used to fill the capsular bag.  A PCB00, 22.5 diopter lens was then injected into the capsular bag using the injector.  Once this was performed, a Goldberg secondary instrument was used to rotate the lens into proper position.  Irrigation aspiration was then used to remove all remaining viscoelastic material and center the lens appropriately.  BSS on 30 gauge cannula was then used to hydrate both wounds.  A Weck-Arabella was used to assess intraocular IOP.  Once this was stable and the lens was found to be in good position, the patient  was undraped.  The patient was brought to the recovery area in stable condition.  Family was made aware of the patient's condition and the patient will follow up with us later this afternoon.         COMPLICATIONS:  No complications.         Jorge Rosa MD

## 2019-03-20 ENCOUNTER — TRANSFERRED RECORDS (OUTPATIENT)
Dept: HEALTH INFORMATION MANAGEMENT | Facility: HOSPITAL | Age: 69
End: 2019-03-20

## 2019-03-22 ENCOUNTER — TELEPHONE (OUTPATIENT)
Dept: NUCLEAR MEDICINE | Facility: HOSPITAL | Age: 69
End: 2019-03-22

## 2019-03-22 NOTE — TELEPHONE ENCOUNTER
A reminder call was performed on 3/22/19 for the patients nuclear medicine parathyroid scan on 3/25/19. Patient was given the time, date and location of the exam. A brief explanation was also given to the patient. The patient understood.

## 2019-03-25 ENCOUNTER — HOSPITAL ENCOUNTER (OUTPATIENT)
Dept: NUCLEAR MEDICINE | Facility: HOSPITAL | Age: 69
End: 2019-03-25
Attending: FAMILY MEDICINE
Payer: COMMERCIAL

## 2019-03-25 ENCOUNTER — HOSPITAL ENCOUNTER (OUTPATIENT)
Dept: NUCLEAR MEDICINE | Facility: HOSPITAL | Age: 69
Discharge: HOME | End: 2019-03-25
Attending: FAMILY MEDICINE | Admitting: FAMILY MEDICINE
Payer: COMMERCIAL

## 2019-03-25 DIAGNOSIS — E21.3 HYPERPARATHYROIDISM (H): ICD-10-CM

## 2019-03-25 PROCEDURE — 34300033 ZZH RX 343: Performed by: RADIOLOGY

## 2019-03-25 PROCEDURE — A9500 TC99M SESTAMIBI: HCPCS | Performed by: RADIOLOGY

## 2019-03-25 PROCEDURE — 78070 PARATHYROID PLANAR IMAGING: CPT | Mod: TC

## 2019-03-25 RX ADMIN — Medication 25 MILLICURIE: at 11:45

## 2019-04-01 ENCOUNTER — TRANSFERRED RECORDS (OUTPATIENT)
Dept: HEALTH INFORMATION MANAGEMENT | Facility: HOSPITAL | Age: 69
End: 2019-04-01

## 2019-04-01 LAB
CREAT SERPL-MCNC: 0.73 MG/DL (ref 0.7–1.2)
GFR SERPL CREATININE-BSD FRML MDRD: >60 ML/MIN/1.73M2
GLUCOSE SERPL-MCNC: 94 MG/DL (ref 60–99)
POTASSIUM SERPL-SCNC: 4.3 MEQ/L (ref 3.5–5.1)

## 2019-04-03 ENCOUNTER — ANESTHESIA EVENT (OUTPATIENT)
Dept: SURGERY | Facility: HOSPITAL | Age: 69
End: 2019-04-03
Payer: COMMERCIAL

## 2019-04-03 ASSESSMENT — LIFESTYLE VARIABLES: TOBACCO_USE: 1

## 2019-04-03 NOTE — ANESTHESIA PREPROCEDURE EVALUATION
Anesthesia Pre-Procedure Evaluation    Patient: Nishi Santoyo   MRN: [id number] : 1950          Preoperative Diagnosis: CATARACT RIGHT EYE    Procedure(s):  CATARACT RIGHT EYE WITH IMPLANT    Past Medical History:   Diagnosis Date     Arthritis     RA, osteoarthritis     Diabetes (H)      Hypertension      Obese      Renal disease      Past Surgical History:   Procedure Laterality Date     BACK SURGERY      spine fusion surgery     BREAST SURGERY  1980    bilateral breast reductions     CHOLECYSTECTOMY       COLONOSCOPY  2017     ORTHOPEDIC SURGERY  2003    left total hip     ORTHOPEDIC SURGERY      right total knee     ORTHOPEDIC SURGERY      right total shoulder     ORTHOPEDIC SURGERY  2006    finger and hand joint replacements     PHACOEMULSIFICATION WITH STANDARD INTRAOCULAR LENS IMPLANT Left 3/7/2019    Procedure: CATARACT EXTRACTION WITH IMPLANT LEFT;  Surgeon: Jorge Rosa MD;  Location: [facility]       Anesthesia Evaluation     . Pt has had prior anesthetic.     No history of anesthetic complications          ROS/MED HX    ENT/Pulmonary:     (+)JUAN ALBERTO risk factors (BMI: 49.69) hypertension, obese, tobacco use, Current use <0.5 PPD packs/day  , . .    Neurologic:     (+)migraines,     Cardiovascular:     (+) Dyslipidemia, hypertension----. : . . . :. .       METS/Exercise Tolerance:     Hematologic:  - neg hematologic  ROS       Musculoskeletal: Comment: Osteoarthritis, S/P bilat HILARIA and S/P rt TKA and spinal fusion    (+) arthritis, , , -       GI/Hepatic:  - neg GI/hepatic ROS       Renal/Genitourinary:     (+) chronic renal disease, type: CRI, Pt does not require dialysis, Pt has no history of transplant,       Endo: Comment: hyperparathyriodism and rheumatoid arthritis    (+) type II DM Obesity, .      Psychiatric:     (+) psychiatric history depression      Infectious Disease:  - neg infectious disease ROS       Malignancy:      - no malignancy   Other:    (+) H/O Chronic  "Pain,                        Physical Exam      Airway   Mallampati: III  TM distance: >3 FB  Neck ROM: full    Dental   (+) upper dentures, partials and missing    Cardiovascular   Rhythm and rate: regular and normal      Pulmonary    breath sounds clear to auscultation            Lab Results   Component Value Date    POTASSIUM 4.0 03/01/2019    CR 0.82 03/01/2019    GLC 98 03/01/2019    ALT 43 01/11/2019    AST 44 (H) 01/11/2019    JAY 21 07/09/2014       Preop Vitals  BP Readings from Last 3 Encounters:   03/07/19 111/94   03/02/17 124/81    Pulse Readings from Last 3 Encounters:   03/07/19 74   03/02/17 62      Resp Readings from Last 3 Encounters:   03/07/19 18   03/02/17 16    SpO2 Readings from Last 3 Encounters:   03/07/19 94%   03/02/17 95%      Temp Readings from Last 1 Encounters:   03/07/19 97  F (36.1  C) (Oral)    Ht Readings from Last 1 Encounters:   03/07/19 1.651 m (5' 5\")      Wt Readings from Last 1 Encounters:   03/07/19 135.2 kg (298 lb)    Estimated body mass index is 49.59 kg/m  as calculated from the following:    Height as of 3/7/19: 1.651 m (5' 5\").    Weight as of 3/7/19: 135.2 kg (298 lb).       Anesthesia Plan      History & Physical Review  History and physical reviewed and following examination; no interval change.    ASA Status:  3 .    NPO Status:  > 8 hours    Plan for MAC with Other induction. Maintenance will be TIVA.  Reason for MAC:  Chronic cardiopulmonary disease (G9), Procedure to face, neck, head or breast and Deep or markedly invasive procedure (G8)  PONV prophylaxis:  Ondansetron (or other 5HT-3)  Patient will require full IV sedation in order to tolerate procedure       Postoperative Care  Postoperative pain management:  IV analgesics.      Consents  Anesthetic plan, risks, benefits and alternatives discussed with:  Patient..                 LAVERNE Cason CRNA  "

## 2019-04-04 ENCOUNTER — HOSPITAL ENCOUNTER (OUTPATIENT)
Facility: HOSPITAL | Age: 69
Discharge: HOME | End: 2019-04-04
Attending: OPHTHALMOLOGY | Admitting: OPHTHALMOLOGY
Payer: COMMERCIAL

## 2019-04-04 ENCOUNTER — ANESTHESIA (OUTPATIENT)
Dept: SURGERY | Facility: HOSPITAL | Age: 69
End: 2019-04-04
Payer: COMMERCIAL

## 2019-04-04 VITALS
DIASTOLIC BLOOD PRESSURE: 83 MMHG | HEIGHT: 65 IN | BODY MASS INDEX: 48.15 KG/M2 | TEMPERATURE: 97.4 F | RESPIRATION RATE: 18 BRPM | WEIGHT: 289 LBS | OXYGEN SATURATION: 96 % | SYSTOLIC BLOOD PRESSURE: 162 MMHG

## 2019-04-04 PROCEDURE — 25000128 H RX IP 250 OP 636: Performed by: OPHTHALMOLOGY

## 2019-04-04 PROCEDURE — 71000027 ZZH RECOVERY PHASE 2 EACH 15 MINS: Performed by: OPHTHALMOLOGY

## 2019-04-04 PROCEDURE — 36000056 ZZH SURGERY LEVEL 3 1ST 30 MIN: Performed by: OPHTHALMOLOGY

## 2019-04-04 PROCEDURE — 25000125 ZZHC RX 250: Performed by: NURSE ANESTHETIST, CERTIFIED REGISTERED

## 2019-04-04 PROCEDURE — 37000009 ZZH ANESTHESIA TECHNICAL FEE, EACH ADDTL 15 MIN: Performed by: OPHTHALMOLOGY

## 2019-04-04 PROCEDURE — C9447 INJ, PHENYLEPHRINE KETOROLAC: HCPCS | Performed by: OPHTHALMOLOGY

## 2019-04-04 PROCEDURE — 25000128 H RX IP 250 OP 636: Performed by: NURSE ANESTHETIST, CERTIFIED REGISTERED

## 2019-04-04 PROCEDURE — 25000125 ZZHC RX 250: Performed by: OPHTHALMOLOGY

## 2019-04-04 PROCEDURE — 66984 XCAPSL CTRC RMVL W/O ECP: CPT | Performed by: NURSE ANESTHETIST, CERTIFIED REGISTERED

## 2019-04-04 PROCEDURE — 37000008 ZZH ANESTHESIA TECHNICAL FEE, 1ST 30 MIN: Performed by: OPHTHALMOLOGY

## 2019-04-04 PROCEDURE — 40000305 ZZH STATISTIC PRE PROC ASSESS I: Performed by: OPHTHALMOLOGY

## 2019-04-04 PROCEDURE — 25800030 ZZH RX IP 258 OP 636: Performed by: NURSE ANESTHETIST, CERTIFIED REGISTERED

## 2019-04-04 PROCEDURE — V2632 POST CHMBR INTRAOCULAR LENS: HCPCS | Performed by: OPHTHALMOLOGY

## 2019-04-04 PROCEDURE — 27210794 ZZH OR GENERAL SUPPLY STERILE: Performed by: OPHTHALMOLOGY

## 2019-04-04 PROCEDURE — 66984 XCAPSL CTRC RMVL W/O ECP: CPT | Performed by: ANESTHESIOLOGY

## 2019-04-04 DEVICE — IMPLANTABLE DEVICE: Type: IMPLANTABLE DEVICE | Site: EYE | Status: FUNCTIONAL

## 2019-04-04 RX ORDER — KETAMINE HYDROCHLORIDE 10 MG/ML
INJECTION INTRAMUSCULAR; INTRAVENOUS PRN
Status: DISCONTINUED | OUTPATIENT
Start: 2019-04-04 | End: 2019-04-04

## 2019-04-04 RX ORDER — PROPARACAINE HYDROCHLORIDE 5 MG/ML
1 SOLUTION/ DROPS OPHTHALMIC ONCE
Status: COMPLETED | OUTPATIENT
Start: 2019-04-04 | End: 2019-04-04

## 2019-04-04 RX ORDER — NALOXONE HYDROCHLORIDE 0.4 MG/ML
.1-.4 INJECTION, SOLUTION INTRAMUSCULAR; INTRAVENOUS; SUBCUTANEOUS
Status: DISCONTINUED | OUTPATIENT
Start: 2019-04-04 | End: 2019-04-04 | Stop reason: HOSPADM

## 2019-04-04 RX ORDER — OFLOXACIN 3 MG/ML
1 SOLUTION/ DROPS OPHTHALMIC ONCE
Status: COMPLETED | OUTPATIENT
Start: 2019-04-04 | End: 2019-04-04

## 2019-04-04 RX ORDER — TETRACAINE HYDROCHLORIDE 5 MG/ML
SOLUTION OPHTHALMIC PRN
Status: DISCONTINUED | OUTPATIENT
Start: 2019-04-04 | End: 2019-04-04 | Stop reason: HOSPADM

## 2019-04-04 RX ORDER — LIDOCAINE 40 MG/G
CREAM TOPICAL
Status: DISCONTINUED | OUTPATIENT
Start: 2019-04-04 | End: 2019-04-04 | Stop reason: HOSPADM

## 2019-04-04 RX ORDER — ONDANSETRON 2 MG/ML
4 INJECTION INTRAMUSCULAR; INTRAVENOUS EVERY 30 MIN PRN
Status: DISCONTINUED | OUTPATIENT
Start: 2019-04-04 | End: 2019-04-04 | Stop reason: HOSPADM

## 2019-04-04 RX ORDER — PHENYLEPHRINE HYDROCHLORIDE 25 MG/ML
1 SOLUTION/ DROPS OPHTHALMIC
Status: COMPLETED | OUTPATIENT
Start: 2019-04-04 | End: 2019-04-04

## 2019-04-04 RX ORDER — OFLOXACIN 3 MG/ML
SOLUTION/ DROPS OPHTHALMIC PRN
Status: DISCONTINUED | OUTPATIENT
Start: 2019-04-04 | End: 2019-04-04 | Stop reason: HOSPADM

## 2019-04-04 RX ORDER — CYCLOPENTOLATE HYDROCHLORIDE 10 MG/ML
1 SOLUTION/ DROPS OPHTHALMIC
Status: COMPLETED | OUTPATIENT
Start: 2019-04-04 | End: 2019-04-04

## 2019-04-04 RX ORDER — SODIUM CHLORIDE, SODIUM LACTATE, POTASSIUM CHLORIDE, CALCIUM CHLORIDE 600; 310; 30; 20 MG/100ML; MG/100ML; MG/100ML; MG/100ML
INJECTION, SOLUTION INTRAVENOUS CONTINUOUS PRN
Status: DISCONTINUED | OUTPATIENT
Start: 2019-04-04 | End: 2019-04-04

## 2019-04-04 RX ORDER — ONDANSETRON 4 MG/1
4 TABLET, ORALLY DISINTEGRATING ORAL EVERY 30 MIN PRN
Status: DISCONTINUED | OUTPATIENT
Start: 2019-04-04 | End: 2019-04-04 | Stop reason: HOSPADM

## 2019-04-04 RX ORDER — PREDNISOLONE ACETATE 10 MG/ML
SUSPENSION/ DROPS OPHTHALMIC PRN
Status: DISCONTINUED | OUTPATIENT
Start: 2019-04-04 | End: 2019-04-04 | Stop reason: HOSPADM

## 2019-04-04 RX ORDER — PROPOFOL 10 MG/ML
INJECTION, EMULSION INTRAVENOUS CONTINUOUS PRN
Status: DISCONTINUED | OUTPATIENT
Start: 2019-04-04 | End: 2019-04-04

## 2019-04-04 RX ORDER — PROPOFOL 10 MG/ML
INJECTION, EMULSION INTRAVENOUS PRN
Status: DISCONTINUED | OUTPATIENT
Start: 2019-04-04 | End: 2019-04-04

## 2019-04-04 RX ADMIN — CYCLOPENTOLATE HYDROCHLORIDE 1 DROP: 10 SOLUTION OPHTHALMIC at 09:33

## 2019-04-04 RX ADMIN — MIDAZOLAM 2 MG: 1 INJECTION INTRAMUSCULAR; INTRAVENOUS at 10:15

## 2019-04-04 RX ADMIN — FLURBIPROFEN SODIUM 0.5 ML: 0.3 SOLUTION/ DROPS OPHTHALMIC at 09:39

## 2019-04-04 RX ADMIN — PROPARACAINE HYDROCHLORIDE 1 DROP: 5 SOLUTION/ DROPS OPHTHALMIC at 09:33

## 2019-04-04 RX ADMIN — PHENYLEPHRINE HYDROCHLORIDE 1 DROP: 25 SOLUTION/ DROPS OPHTHALMIC at 09:39

## 2019-04-04 RX ADMIN — Medication 10 MG: at 10:19

## 2019-04-04 RX ADMIN — CYCLOPENTOLATE HYDROCHLORIDE 1 DROP: 10 SOLUTION OPHTHALMIC at 09:39

## 2019-04-04 RX ADMIN — SODIUM CHLORIDE, POTASSIUM CHLORIDE, SODIUM LACTATE AND CALCIUM CHLORIDE: 600; 310; 30; 20 INJECTION, SOLUTION INTRAVENOUS at 09:19

## 2019-04-04 RX ADMIN — PHENYLEPHRINE HYDROCHLORIDE 1 DROP: 25 SOLUTION/ DROPS OPHTHALMIC at 09:33

## 2019-04-04 RX ADMIN — PROPOFOL 20 MG: 10 INJECTION, EMULSION INTRAVENOUS at 10:22

## 2019-04-04 RX ADMIN — PROPOFOL 50 MCG/KG/MIN: 10 INJECTION, EMULSION INTRAVENOUS at 10:22

## 2019-04-04 RX ADMIN — OFLOXACIN 1 DROP: 3 SOLUTION/ DROPS OPHTHALMIC at 09:34

## 2019-04-04 ASSESSMENT — MIFFLIN-ST. JEOR: SCORE: 1836.78

## 2019-04-04 NOTE — ANESTHESIA CARE TRANSFER NOTE
Patient: Nishi Santoyo    Procedure(s):  CATARACT RIGHT EYE WITH IMPLANT    Diagnosis: CATARACT RIGHT EYE  Diagnosis Additional Information: No value filed.    Anesthesia Type:   MAC     Note:  Airway :Nasal Cannula  Patient transferred to:Phase II  Handoff Report: Identifed the Patient, Identified the Reponsible Provider, Reviewed the pertinent medical history, Discussed the surgical course, Reviewed Intra-OP anesthesia mangement and issues during anesthesia, Set expectations for post-procedure period and Allowed opportunity for questions and acknowledgement of understanding      Vitals: (Last set prior to Anesthesia Care Transfer)    CRNA VITALS  4/4/2019 1011 - 4/4/2019 1048      4/4/2019             Pulse:  58    Ht Rate:  58    SpO2:  98 %    Resp Rate (set):  8                Electronically Signed By: LAVERNE Mock CRNA  April 4, 2019  10:48 AM

## 2019-04-04 NOTE — ANESTHESIA POSTPROCEDURE EVALUATION
Patient: Nishi Santoyo    Procedure(s):  CATARACT RIGHT EYE WITH IMPLANT    Diagnosis:CATARACT RIGHT EYE  Diagnosis Additional Information: No value filed.    Anesthesia Type:  MAC    Note:  Anesthesia Post Evaluation    Patient location during evaluation: Phase 2 and Bedside  Patient participation: Able to fully participate in evaluation  Level of consciousness: awake and alert  Pain management: adequate  Airway patency: patent  Cardiovascular status: acceptable  Respiratory status: acceptable  Hydration status: stable  PONV: none     Anesthetic complications: None          Last vitals:  Vitals:    04/04/19 1055 04/04/19 1100 04/04/19 1105   BP: 142/91 141/80 158/74   Resp: 18 18 18   Temp:      SpO2: 96% 95% 97%         Electronically Signed By: Mark Stubbs MD  April 4, 2019  11:27 AM

## 2019-04-04 NOTE — OP NOTE
DATE OF SERVICE: 4/4/19       PREOPERATIVE DIAGNOSIS:     Cataract, Right eye.       POSTOPERATIVE DIAGNOSIS:  Cataract, Right eye.       PROCEDURE:  Phacoemulsification with intraocular lens implant, Model PCB00, 22.5 diopter power.       ANESTHESIA:  Topical with IV sedation.         DESCRIPTION OF PROCEDURE:   Operative risks, benefits and alternatives to the procedure were discussed at length with the patient including loss of vision, loss of the eye.  Patient voiced understanding and informed consent was signed.  The patient was taken to the operating suite, where an appropriate level of anesthesia was given.  Attention was placed to the right eye.  The patient was then prepped and draped in the usual sterile ophthalmic manner.  A lid speculum was placed in the eye to provide exposure and MVR blade was used to make a paracentesis.  Once this was performed, Shugarcaine was then placed intracamerally.  This was then followed by intracameral Viscoat.  A 2.75 mm blade was then used to make a biplanar temporal clear corneal incision.  A cystotome and uttrata were used to make a continuous curvilinear capsulorrhexis.  BSS on Jackson cannula was then used to hydrodissect the lens.  Phacoemulsification was then performed in a divide-and-conquer technique to remove all pieces of the nucleus.  Irrigation aspiration was then used to remove all remaining cortical material and debris.  Amvisc was then used to fill the capsular bag.  A PCB00 22.5 diopter lens was then injected into the capsular bag using the injector.  Once this was performed, a Goldberg secondary instrument was used to rotate the lens into proper position.  Irrigation aspiration was then used to remove all remaining viscoelastic material and center the lens appropriately.  BSS on 30 gauge cannula was then used to hydrate both wounds.  A Weck-Arabella was used to assess intraocular IOP.  Once this was stable and the lens was found to be in good position, the  patient was undraped.  The patient was brought to the recovery area in stable condition.  Family was made aware of the patient's condition and the patient will follow up with us later this afternoon.  No complications.           Jorge Rosa MD

## 2019-04-04 NOTE — OR NURSING
Took juice and cookies w/o nausea.Instructed.Patient and responsible adult given discharge instructions with no questions regarding instructions. Cynthia score 20. Pain level 0/10.  Discharged from unit via w/c. Patient discharged to home.

## 2019-04-04 NOTE — DISCHARGE INSTRUCTIONS
AFTER CATARACT SURGERY RESTRICTIONS    SHIELD: WEAR OVER SURGICAL EYE AT NIGHT FOR 1 WEEK    ACTIVITY/LIFTING: Avoid activities, which increase abdominal/head pressure such as pulling on a starter cord, heavy lifting (over 15-20 lbs) or bending with the head below the waist, for 1 week. Avoid sudden jerky movements (chopping, shoveling) for 1 week. Waking and lower body exercise such as biking is okay.     WATER: Showering/washing hair is okay the day after surgery, but avoid excess water, soap, or shampoo in your eyes. Clean eyelids gently with a clean, wet washcloth as needed. Avoid pressure on the eye.     SUNLIGHT: If the eye is light sensitive after surgery, dark glasses may be worn.     DIET/MEDICATIONS: Resume your usual diet and medications your family doctor has prescribed. Continue to use/follow the instructions for your eye drops.     DRIVING: Avoid driving with a patch. Resume driving when you feel safe, but don't drive on the day of surgery.    PAIN: Minor pain and itching is normal during healing. DO NOT RUB THE EYE! Report any unusual swelling, increased discharge or pain that is not relieved by Tylenol (or equivalent). If sudden drop/change in vision call immediately. [facility] [phone]    CONTINUE TO USE ALL THE EYE DROPS PER THE INSTRUCTIONS ORDERED BY DR. CASTRO'S TECHNICIANS    FOR EMERGENCY DR. MARTÍNEZ: [phone]    FOLLOW EYE DROP INSTRUCTIONS GIVEN BY 's OFFICE      Post-Anesthesia Patient Instructions    IMMEDIATELY FOLLOWING SURGERY:  Do not drive or operate machinery for the first twenty four hours after surgery.  Do not make any important decisions for twenty four hours after surgery or while taking narcotic pain medications or sedatives.  If you develop intractable nausea and vomiting or a severe headache please notify your doctor immediately.    FOLLOW-UP:  Please make an appointment with your surgeon as instructed. You do not need to follow up with anesthesia  unless specifically instructed to do so.    WOUND CARE INSTRUCTIONS (if applicable):  Keep a dry clean dressing on the anesthesia/puncture wound site if there is drainage.  Once the wound has quit draining you may leave it open to air.  Generally you should leave the bandage intact for twenty four hours unless there is drainage.  If the epidural site drains for more than 36-48 hours please call the anesthesia department.    QUESTIONS?:  Please feel free to call your physician or the hospital  if you have any questions, and they will be happy to assist you.

## 2019-11-05 ENCOUNTER — ANCILLARY PROCEDURE (OUTPATIENT)
Dept: MAMMOGRAPHY | Facility: OTHER | Age: 69
End: 2019-11-05
Attending: FAMILY MEDICINE
Payer: COMMERCIAL

## 2019-11-05 DIAGNOSIS — Z12.31 VISIT FOR SCREENING MAMMOGRAM: ICD-10-CM

## 2019-11-05 PROCEDURE — 77063 BREAST TOMOSYNTHESIS BI: CPT | Mod: TC

## 2019-11-07 ENCOUNTER — TELEPHONE (OUTPATIENT)
Dept: MAMMOGRAPHY | Facility: OTHER | Age: 69
End: 2019-11-07

## 2019-11-07 NOTE — TELEPHONE ENCOUNTER
I had received a phone call from Nishi today @ [phone]. She just wanted to let us know that when she was in for her mammogram she did end up getting a tear under her breast.   The Tech did use our Margot Blankets that are suppose to help with tearing.  Nishi will talk with her Provider at her next appointment regarding her next years screening. Nishi is hoping for another type of exam that could possibly be performed. She has had a Breast MRI in 2018.

## 2019-11-29 ENCOUNTER — HOSPITAL ENCOUNTER (OUTPATIENT)
Dept: MRI IMAGING | Facility: HOSPITAL | Age: 69
Discharge: HOME | End: 2019-11-29
Attending: FAMILY MEDICINE | Admitting: FAMILY MEDICINE
Payer: COMMERCIAL

## 2019-11-29 DIAGNOSIS — R20.0 ANESTHESIA OF SKIN: ICD-10-CM

## 2019-11-29 DIAGNOSIS — R20.0 RIGHT ARM NUMBNESS: ICD-10-CM

## 2019-11-29 LAB
CREAT BLD-MCNC: 0.9 MG/DL (ref 0.52–1.04)
GFR SERPL CREATININE-BSD FRML MDRD: 62 ML/MIN/{1.73_M2}

## 2019-11-29 PROCEDURE — A9585 GADOBUTROL INJECTION: HCPCS | Performed by: RADIOLOGY

## 2019-11-29 PROCEDURE — 70553 MRI BRAIN STEM W/O & W/DYE: CPT | Mod: TC

## 2019-11-29 PROCEDURE — 25500064 ZZH RX 255 OP 636: Performed by: RADIOLOGY

## 2019-11-29 PROCEDURE — 82565 ASSAY OF CREATININE: CPT

## 2019-11-29 RX ORDER — GADOBUTROL 604.72 MG/ML
10 INJECTION INTRAVENOUS ONCE
Status: COMPLETED | OUTPATIENT
Start: 2019-11-29 | End: 2019-11-29

## 2019-11-29 RX ADMIN — GADOBUTROL 10 ML: 604.72 INJECTION INTRAVENOUS at 18:24

## 2020-01-29 ENCOUNTER — HOSPITAL ENCOUNTER (OUTPATIENT)
Dept: MRI IMAGING | Facility: HOSPITAL | Age: 70
Discharge: HOME | End: 2020-01-29
Attending: PSYCHIATRY & NEUROLOGY | Admitting: PSYCHIATRY & NEUROLOGY
Payer: COMMERCIAL

## 2020-01-29 DIAGNOSIS — G89.29 OTHER CHRONIC PAIN: ICD-10-CM

## 2020-01-29 DIAGNOSIS — M54.2 CERVICALGIA: ICD-10-CM

## 2020-01-29 PROCEDURE — 72141 MRI NECK SPINE W/O DYE: CPT | Mod: TC

## 2020-07-21 ENCOUNTER — MEDICAL CORRESPONDENCE (OUTPATIENT)
Dept: MRI IMAGING | Facility: HOSPITAL | Age: 70
End: 2020-07-21

## 2020-07-29 ENCOUNTER — HOSPITAL ENCOUNTER (OUTPATIENT)
Dept: MRI IMAGING | Facility: HOSPITAL | Age: 70
Discharge: HOME | End: 2020-07-29
Attending: FAMILY MEDICINE | Admitting: FAMILY MEDICINE
Payer: COMMERCIAL

## 2020-07-29 DIAGNOSIS — R20.0 ANESTHESIA OF SKIN: ICD-10-CM

## 2020-07-29 DIAGNOSIS — M50.20 OTHER CERVICAL DISC DISPLACEMENT, UNSPECIFIED CERVICAL REGION: ICD-10-CM

## 2020-07-29 DIAGNOSIS — G89.29 OTHER CHRONIC PAIN: ICD-10-CM

## 2020-07-29 DIAGNOSIS — M54.2 CERVICALGIA: ICD-10-CM

## 2020-07-29 PROCEDURE — 72141 MRI NECK SPINE W/O DYE: CPT | Mod: TC

## 2024-08-21 ENCOUNTER — APPOINTMENT (OUTPATIENT)
Dept: GENERAL RADIOLOGY | Facility: HOSPITAL | Age: 74
End: 2024-08-21
Attending: NURSE PRACTITIONER
Payer: COMMERCIAL

## 2024-08-21 ENCOUNTER — HOSPITAL ENCOUNTER (EMERGENCY)
Facility: HOSPITAL | Age: 74
Discharge: HOME | End: 2024-08-21
Attending: NURSE PRACTITIONER | Admitting: NURSE PRACTITIONER
Payer: COMMERCIAL

## 2024-08-21 ENCOUNTER — APPOINTMENT (OUTPATIENT)
Dept: CT IMAGING | Facility: HOSPITAL | Age: 74
End: 2024-08-21
Attending: NURSE PRACTITIONER
Payer: COMMERCIAL

## 2024-08-21 VITALS
OXYGEN SATURATION: 93 % | RESPIRATION RATE: 18 BRPM | SYSTOLIC BLOOD PRESSURE: 113 MMHG | HEART RATE: 78 BPM | TEMPERATURE: 98.4 F | DIASTOLIC BLOOD PRESSURE: 80 MMHG
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
## 2024-08-21 DIAGNOSIS — S00.81XA FACIAL ABRASION, INITIAL ENCOUNTER: ICD-10-CM

## 2024-08-21 DIAGNOSIS — S52.501A CLOSED FRACTURE OF DISTAL END OF RIGHT RADIUS, UNSPECIFIED FRACTURE MORPHOLOGY, INITIAL ENCOUNTER: ICD-10-CM

## 2024-08-21 DIAGNOSIS — E87.6 HYPOKALEMIA: ICD-10-CM

## 2024-08-21 DIAGNOSIS — S90.112A CONTUSION OF LEFT GREAT TOE WITHOUT DAMAGE TO NAIL, INITIAL ENCOUNTER: ICD-10-CM

## 2024-08-21 DIAGNOSIS — S50.11XA CONTUSION OF RIGHT FOREARM, INITIAL ENCOUNTER: ICD-10-CM
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
## 2024-08-21 DIAGNOSIS — S00.83XA TRAUMATIC HEMATOMA OF CHEEK, INITIAL ENCOUNTER: ICD-10-CM

## 2024-08-21 LAB
ANION GAP SERPL CALCULATED.3IONS-SCNC: 18 MMOL/L (ref 7–15)
APTT PPP: 33 SECONDS (ref 22–38)
BUN SERPL-MCNC: 9.4 MG/DL (ref 8–23)
CALCIUM SERPL-MCNC: 9.5 MG/DL (ref 8.8–10.4)
CHLORIDE SERPL-SCNC: 96 MMOL/L (ref 98–107)
CREAT SERPL-MCNC: 0.89 MG/DL (ref 0.51–0.95)
EGFRCR SERPLBLD CKD-EPI 2021: 67 ML/MIN/1.73M2
ERYTHROCYTE [DISTWIDTH] IN BLOOD BY AUTOMATED COUNT: 13.9 % (ref 10–15)
GLUCOSE SERPL-MCNC: 126 MG/DL (ref 70–99)
HCO3 SERPL-SCNC: 20 MMOL/L (ref 22–29)
HCT VFR BLD AUTO: 45.5 % (ref 35–47)
HGB BLD-MCNC: 15.6 G/DL (ref 11.7–15.7)
HOLD SPECIMEN: NORMAL
HOLD SPECIMEN: NORMAL
INR PPP: 1.25 (ref 0.85–1.15)
MCH RBC QN AUTO: 31.5 PG (ref 26.5–33)
MCHC RBC AUTO-ENTMCNC: 34.3 G/DL (ref 31.5–36.5)
MCV RBC AUTO: 92 FL (ref 78–100)
PLATELET # BLD AUTO: 189 10E3/UL (ref 150–450)
POTASSIUM SERPL-SCNC: 3.1 MMOL/L (ref 3.4–5.3)
RBC # BLD AUTO: 4.96 10E6/UL (ref 3.8–5.2)
SODIUM SERPL-SCNC: 134 MMOL/L (ref 135–145)
WBC # BLD AUTO: 12.6 10E3/UL (ref 4–11)

## 2024-08-21 PROCEDURE — 25605 CLTX DST RDL FX/EPHYS SEP W/: CPT | Mod: 54 | Performed by: NURSE PRACTITIONER

## 2024-08-21 PROCEDURE — 99284 EMERGENCY DEPT VISIT MOD MDM: CPT | Mod: 25 | Performed by: NURSE PRACTITIONER

## 2024-08-21 PROCEDURE — 80048 BASIC METABOLIC PNL TOTAL CA: CPT | Performed by: NURSE PRACTITIONER

## 2024-08-21 PROCEDURE — 36415 COLL VENOUS BLD VENIPUNCTURE: CPT | Performed by: NURSE PRACTITIONER

## 2024-08-21 PROCEDURE — 73090 X-RAY EXAM OF FOREARM: CPT | Mod: RT

## 2024-08-21 PROCEDURE — 90715 TDAP VACCINE 7 YRS/> IM: CPT | Performed by: NURSE PRACTITIONER

## 2024-08-21 PROCEDURE — 90471 IMMUNIZATION ADMIN: CPT | Performed by: NURSE PRACTITIONER

## 2024-08-21 PROCEDURE — 250N000013 HC RX MED GY IP 250 OP 250 PS 637: Performed by: NURSE PRACTITIONER

## 2024-08-21 PROCEDURE — 85610 PROTHROMBIN TIME: CPT | Performed by: NURSE PRACTITIONER

## 2024-08-21 PROCEDURE — 99284 EMERGENCY DEPT VISIT MOD MDM: CPT | Mod: 25

## 2024-08-21 PROCEDURE — 25605 CLTX DST RDL FX/EPHYS SEP W/: CPT | Mod: RT

## 2024-08-21 PROCEDURE — 85730 THROMBOPLASTIN TIME PARTIAL: CPT | Performed by: NURSE PRACTITIONER

## 2024-08-21 PROCEDURE — 85014 HEMATOCRIT: CPT | Performed by: NURSE PRACTITIONER

## 2024-08-21 PROCEDURE — 73100 X-RAY EXAM OF WRIST: CPT | Mod: RT

## 2024-08-21 PROCEDURE — 73130 X-RAY EXAM OF HAND: CPT | Mod: RT

## 2024-08-21 PROCEDURE — 73630 X-RAY EXAM OF FOOT: CPT | Mod: LT

## 2024-08-21 PROCEDURE — 72125 CT NECK SPINE W/O DYE: CPT

## 2024-08-21 PROCEDURE — 70450 CT HEAD/BRAIN W/O DYE: CPT

## 2024-08-21 PROCEDURE — 250N000011 HC RX IP 250 OP 636: Performed by: NURSE PRACTITIONER

## 2024-08-21 RX ORDER — BUPIVACAINE HYDROCHLORIDE 5 MG/ML
10 INJECTION, SOLUTION EPIDURAL; INTRACAUDAL ONCE
Status: COMPLETED | OUTPATIENT
Start: 2024-08-21 | End: 2024-08-21

## 2024-08-21 RX ORDER — POTASSIUM CHLORIDE 1500 MG/1
40 TABLET, EXTENDED RELEASE ORAL ONCE
Status: COMPLETED | OUTPATIENT
Start: 2024-08-21 | End: 2024-08-21

## 2024-08-21 RX ADMIN — POTASSIUM CHLORIDE 40 MEQ: 1500 TABLET, EXTENDED RELEASE ORAL at 16:53

## 2024-08-21 RX ADMIN — BUPIVACAINE HYDROCHLORIDE 50 MG: 5 INJECTION, SOLUTION EPIDURAL; INTRACAUDAL; PERINEURAL at 14:34

## 2024-08-21 RX ADMIN — CLOSTRIDIUM TETANI TOXOID ANTIGEN (FORMALDEHYDE INACTIVATED), CORYNEBACTERIUM DIPHTHERIAE TOXOID ANTIGEN (FORMALDEHYDE INACTIVATED), BORDETELLA PERTUSSIS TOXOID ANTIGEN (GLUTARALDEHYDE INACTIVATED), BORDETELLA PERTUSSIS FILAMENTOUS HEMAGGLUTININ ANTIGEN (FORMALDEHYDE INACTIVATED), BORDETELLA PERTUSSIS PERTACTIN ANTIGEN, AND BORDETELLA PERTUSSIS FIMBRIAE 2/3 ANTIGEN 0.5 ML: 5; 2; 2.5; 5; 3; 5 INJECTION, SUSPENSION INTRAMUSCULAR at 16:53

## 2024-08-21 ASSESSMENT — ENCOUNTER SYMPTOMS
ALLERGIC/IMMUNOLOGIC NEGATIVE: 1
CONSTITUTIONAL NEGATIVE: 1
CARDIOVASCULAR NEGATIVE: 1
RESPIRATORY NEGATIVE: 1
PSYCHIATRIC NEGATIVE: 1
HEMATOLOGIC/LYMPHATIC NEGATIVE: 1
GASTROINTESTINAL NEGATIVE: 1
EYES NEGATIVE: 1
ENDOCRINE NEGATIVE: 1
NEUROLOGICAL NEGATIVE: 1
COLOR CHANGE: 1

## 2024-08-21 ASSESSMENT — COLUMBIA-SUICIDE SEVERITY RATING SCALE - C-SSRS
1. IN THE PAST MONTH, HAVE YOU WISHED YOU WERE DEAD OR WISHED YOU COULD GO TO SLEEP AND NOT WAKE UP?: NO
2. HAVE YOU ACTUALLY HAD ANY THOUGHTS OF KILLING YOURSELF IN THE PAST MONTH?: NO
6. HAVE YOU EVER DONE ANYTHING, STARTED TO DO ANYTHING, OR PREPARED TO DO ANYTHING TO END YOUR LIFE?: NO

## 2024-08-21 ASSESSMENT — ACTIVITIES OF DAILY LIVING (ADL)
ADLS_ACUITY_SCORE: 35

## 2024-08-21 NOTE — ED TRIAGE NOTES
Pt fell 2 days ago while running to the bathroom.  C/o right hand pain with ecchymosis and swelling, right upper forearm ecchymosis, left large toe pain and ecchymosis, ecchymosis under right eye and on right chest/breast.  No blood thinners.   Ecchymosis also noted left upper arm/bicep area.

## 2024-08-21 NOTE — ED NOTES
Medication list is 5 years old, pt does not have a current list with here nor does she know what she takes.

## 2024-08-21 NOTE — ED NOTES
[employer] changed to level 2 per Dr Crenshaw.  Moved to room 2 via w/cLupis RN at bedside.  Pt assisted to cart, gown placed.

## 2024-08-21 NOTE — ED NOTES
Patient to room 2, changed into gown and call light with in reach.    Patient states she tripped on a rug 2 nights ago and fell in her home. States after about 2 hours she was able to get up onto the couch. States she is at home and lives by herself. States she uses 2 canes, a walker, or a wheel chair depending where she is in her home.   Patient has multiple bruising on her body.    Right hand, wrist, and fore arm swollen and painful. 2 rings removed and given to patient.   Left elbow abrasion  Chest brushing  Right cheek abrasion  Left big toe brusing

## 2024-08-21 NOTE — ED PROVIDER NOTES
History     Chief Complaint   Patient presents with    Fall     Fall 2 days ago    Arm Injury     Ecchymosis right upper forearm    Hand Injury     Right hand injury with ecchymosis and swelling from fall 2 days ago    Toe Pain     Left large toe ecchymosis from fall 2 days ago     HPI  Nishi Santoyo is a 74 year old individual with history of DMT2, hypertension, osteoarthritis, rheumatoid arthritis, GERD, comes in for fall with right arm pain.  Patient states that she fell on 8/19/2024.  Fell onto her right side.  Did hit her head.  No loss of consciousness occurred.  States is having right forearm and wrist pain since that is keeping her up.  For this reason patient comes in.  No headache or visual disturbance reported.  No neck pain or back pain.  Denies any shortness of breath.  No left arm pain.  No pelvic pain or lower extremity injury.  Patient states is ambulatory without issue.  States is able to move the right arm but has significant pain in the forearm and wrist area.    Allergies:  Allergies   Allergen Reactions    Aspirin GI Disturbance    Clarithromycin GI Disturbance     Biaxin    Gabapentin      Neurontin; not listed on [facility] 1/11/19    Ibuprofen GI Disturbance    Lisinopril GI Disturbance    Metformin Diarrhea    Minocycline      Passed out    Oxycodone Hcl      OxyContin; Not listed on [facility] 1/11/19    Prednisone Swelling     Can't recall, says it wasn't severe or I would have remembered; cannot recall what was swelling-facial cheeks  Provider wants to try the prednisolone 1% ophth. drops(2/5/19)    Seafood Nausea    Topiramate     Unknown [No Clinical Screening - See Comments]      VLORIC     Doxycycline Rash       Problem List:    Patient Active Problem List    Diagnosis Date Noted    DM2 (diabetes mellitus, type 2) (H) 11/18/2014     Priority: Medium    Hypertension 11/18/2014     Priority: Medium    Rheumatoid arthritis (H) 11/18/2014     Priority: Medium    Generalized  osteoarthritis 06/11/2004     Priority: Medium    Gastroesophageal reflux disease 06/11/2004     Priority: Medium        Past Medical History:    Past Medical History:   Diagnosis Date    Arthritis     Diabetes (H)     Hypertension     Obese     Renal disease        Past Surgical History:    Past Surgical History:   Procedure Laterality Date    BACK SURGERY  2014    spine fusion surgery    BREAST SURGERY  1980    bilateral breast reductions    CHOLECYSTECTOMY      COLONOSCOPY  08/04/2017    ORTHOPEDIC SURGERY  2003    left total hip    ORTHOPEDIC SURGERY  2004    right total knee    ORTHOPEDIC SURGERY  2005    right total shoulder    ORTHOPEDIC SURGERY  2006    finger and hand joint replacements    PHACOEMULSIFICATION WITH STANDARD INTRAOCULAR LENS IMPLANT Left 3/7/2019    Procedure: CATARACT EXTRACTION WITH IMPLANT LEFT;  Surgeon: Jorge Rosa MD;  Location: [facility] OR    PHACOEMULSIFICATION WITH STANDARD INTRAOCULAR LENS IMPLANT Right 4/4/2019    Procedure: CATARACT RIGHT EYE WITH IMPLANT;  Surgeon: Jorge Rosa MD;  Location: [facility] OR       Family History:    No family history on file.    Social History:  Marital Status:   [4]  Social History     Tobacco Use    Smoking status: Every Day    Smokeless tobacco: Never   Substance Use Topics    Alcohol use: Yes        Medications:    ALPRAZolam (XANAX) 1 MG tablet  atorvastatin (LIPITOR) 20 MG tablet  Cholecalciferol (VITAMIN D3) 2000 units CAPS  collagenase (SANTYL) 250 UNIT/GM external ointment  denosumab (PROLIA) 60 MG/ML SOLN injection  hemp seed oil  hydrochlorothiazide (HYDRODIURIL) 12.5 MG tablet  levothyroxine (SYNTHROID/LEVOTHROID) 50 MCG tablet  magnesium chloride 535 (64 Mg) MG TBEC CR tablet  metoprolol succinate ER (TOPROL-XL) 50 MG 24 hr tablet  mometasone (ELOCON) 0.1 % external cream  mupirocin (BACTROBAN) 2 % external ointment  nystatin (MYCOSTATIN) 314716 UNIT/GM external powder  omeprazole (PRILOSEC) 40 MG DR capsule  oxyCODONE  (OXYCONTIN) 10 MG 12 hr tablet  prochlorperazine (COMPAZINE) 10 MG tablet  solifenacin (VESICARE) 5 MG tablet  SUMAtriptan (IMITREX) 100 MG tablet  TOLTERODINE TARTRATE PO          Review of Systems   Constitutional: Negative.    HENT: Negative.     Eyes: Negative.    Respiratory: Negative.     Cardiovascular: Negative.    Gastrointestinal: Negative.    Endocrine: Negative.    Genitourinary: Negative.    Musculoskeletal:         Right forearm and wrist pain, left great toe pain   Skin:  Positive for color change (Bruising to right forearm and wrist, bruising to left great toe).   Allergic/Immunologic: Negative.    Neurological: Negative.    Hematological: Negative.    Psychiatric/Behavioral: Negative.         Physical Exam   BP: 95/59  Pulse: 86  Temp: 98.4  F (36.9  C)  Resp: 18  SpO2: 99 %      GENERAL APPEARANCE:  The patient is a 74 year old well-developed, well-nourished individual in no acute distress that appears as stated age.  HEENT:  Normocephalic.  No soft spots, indentations, tenderness noted upon palpation of the scalp or face.  No crepitus or deformities noted to face or scalp.  Pupils are equal, round, and reactive to light.  Oropharynx is clear.  No avulsed teeth, buccal or tongue lacerations present.  Voice is clear and without muffling.   No otorrhea or rhinorrhea present.  Negative cottrell sign.  Negative for hemotympanum bilaterally.  NECK:  Supple.  Trachea is midline.  No carotid bruits present on auscultation.  CHEST:  Symmetric.  Non-tender to palpation.  No crepitus or deformity.  LUNGS:  Breathing is easy.  Breath sounds are equal and clear bilaterally.  No wheezes, rhonchi, or rales.  HEART:  Regular rate and rhythm with normal S1 and S2.  No murmurs, gallops, or rubs.  ABDOMEN:  Soft.  No mass, tenderness, guarding, or rebound.  No organomegaly or hernia.  Bowel sounds are present.  No CVA tenderness or flank mass.  No abdominal bruits or thrills present upon auscultation/palpation.   Negative Juan Daniel sign.  Negative Chris Brian sign.  EXTREMITIES: Swelling and bruising to right forearm and wrist.  Pedal and post-tibial pulses are 2+ bilaterally.  Radial pulses are 2+ bilaterally.  MUSCULOSKELETAL:  No cervical/thoracic/lumbar spinal tenderness, step-offs, deformities noted to palpation.  No paraspinal tenderness noted to palpation.  Pelvis stable upon palpation.  No crepitus, deformities, noted to palpation to the upper or lower extremities to palpation.  Tenderness to palpation to right forearm and wrist.  No crepitus or obvious deformity.  Also has tenderness to left great toe upon palpation.  No crepitus or deformity.  Pain with range of motion to both knees.  MENTAL STATUS:   The patient was alert and oriented to person, place, time and purpose. Registration and recall intact. No difficulty with concentration.  Spontaneous eye opening.  Follows commands.  GCS 15.   CRANIAL NERVES:  PERRL. EOMI; no nystagmus.  Full visual fields.  Trapezius and sternocleidomastoid are full strength. Tongue was midline and protrudes midline. Uvula was midline and raises midline. Facial sensation was intact to pain and light touch at all distributions. No speech disturbance. Hearing intact to conversation and whisper.  No facial asymmetry.  SENSORY:  Sensation intact.  PSYCHIATRIC:  Appropriate mood and affect.  Calm and cooperative with history and physical exam.  SKIN:  Warm, dry, and well perfused.  Abrasion to bridge of nose but no toxic striations or surrounding erythema.  No drainage or obvious foreign body.  0.4 cm circular hematoma to right cheek.  TDAP STATUS: Last Tdap given unknown.      Comment: Discrepancies between my note and notes on behalf of the nursing team or other care providers are secondary to my findings reflecting my physical examination and questioning of the patient.  Any conflicting information provided is not in line with my examination of the patient.       ED Course     ED Course  as of 08/21/24 1657   Wed Aug 21, 2024   1240 In to see patient and history/physical completed.    1241 CT of head and cervical spine ordered.  X-ray of right forearm, wrist, hand ordered.   1246 Labs ordered due to lower blood pressure of 95/59.   1450 Patient deferred questions about hematoma block and reduction.  Signed consent.  Hematoma block conducted.   1507 Patient placed in a trap splint.   1554 Short arm volar splint placed with ulnar deviation.  Repeat x-ray ordered.   1620 Patient will be discharged home for follow-up with orthopedics.  Acetaminophen/ibuprofen for pain control.  Return precautions given.     [facility]    -Fracture    Date/Time: 8/21/2024 3:54 PM    Performed by: Dougie Tucker APRN CNP  Authorized by: Dougie Tucker APRN CNP    Risks, benefits and alternatives discussed.      INJURY      Injury location:  Forearm    Forearm injury location:  R forearm    Forearm fracture type: distal radial      PRE PROCEDURE ASSESSMENT      Neurological function: normal      Distal perfusion: normal      Range of motion: reduced      ANESTHESIA (see MAR for exact dosages)      Anesthesia method:  Nerve block    Block location:  Wrist    Block needle gauge:  27 G    Block anesthetic:  Bupivacaine 0.5% w/o epi    Block technique:  Hematoma    Block injection procedure:  Anatomic landmarks identified, introduced needle, incremental injection, anatomic landmarks palpated and negative aspiration for blood    Block outcome:  Incomplete block    PROCEDURE DETAILS:     Skeletal traction used: yes (Chinese fingertrap)      X-ray confirmed reduction: yes      Immobilization:  Splint    Splint type:  Volar short arm    Supplies used:  Plaster, cotton padding and elastic bandage    POST PROCEDURE ASSESSMENT      Neurological function: normal      Distal perfusion: normal      Range of motion: unchanged      This procedure is expected to be definitive fracture care. Patient will be referred for  follow-up fracture care.    PROCEDURE    Patient Tolerance:  Patient tolerated the procedure well with no immediate complications                Results for orders placed or performed during the hospital encounter of 08/21/24 (from the past 24 hour(s))   CBC with platelets   Result Value Ref Range    WBC Count 12.6 (H) 4.0 - 11.0 10e3/uL    RBC Count 4.96 3.80 - 5.20 10e6/uL    Hemoglobin 15.6 11.7 - 15.7 g/dL    Hematocrit 45.5 35.0 - 47.0 %    MCV 92 78 - 100 fL    MCH 31.5 26.5 - 33.0 pg    MCHC 34.3 31.5 - 36.5 g/dL    RDW 13.9 10.0 - 15.0 %    Platelet Count 189 150 - 450 10e3/uL   INR   Result Value Ref Range    INR 1.25 (H) 0.85 - 1.15   Partial thromboplastin time   Result Value Ref Range    aPTT 33 22 - 38 Seconds   Basic metabolic panel   Result Value Ref Range    Sodium 134 (L) 135 - 145 mmol/L    Potassium 3.1 (L) 3.4 - 5.3 mmol/L    Chloride 96 (L) 98 - 107 mmol/L    Carbon Dioxide (CO2) 20 (L) 22 - 29 mmol/L    Anion Gap 18 (H) 7 - 15 mmol/L    Urea Nitrogen 9.4 8.0 - 23.0 mg/dL    Creatinine 0.89 0.51 - 0.95 mg/dL    GFR Estimate 67 >60 mL/min/1.73m2    Calcium 9.5 8.8 - 10.4 mg/dL    Glucose 126 (H) 70 - 99 mg/dL   Extra Tube    Narrative    The following orders were created for panel order Extra Tube.  Procedure                               Abnormality         Status                     ---------                               -----------         ------                     Extra Red Top Tube[[id number]]                               Final result               Extra Heparinized Syringe[[id number]]                        Final result                 Please view results for these tests on the individual orders.   Extra Red Top Tube   Result Value Ref Range    Hold Specimen JIC    Extra Heparinized Syringe   Result Value Ref Range    Hold Specimen JIC    CT Head w/o Contrast    Narrative    Exam: CT HEAD W/O CONTRAST      Exam reason: Fall    Technique:   Axial images of the head obtained without  contrast. Coronal and  sagittal reformations were generated. This CT was performed using one  or more of the following dose reduction techniques: automated exposure  control, adjustment of the mA and/or kV according to patient size,  and/or use of iterative reconstruction technique.    Comparison: 11/29/2019       Findings:      Parenchyma: No evidence of intraparenchymal hemorrhage, mass, acute  cortical infarct or prior infarct in a major vascular territory.      There is patchy periventricular and deep white matter hypoattenuation,  nonspecific but likely due to chronic vascular ischemic change.    No midline shift. The basilar cisterns are patent.    Extra-axial spaces: No extra-axial fluid collection or hemorrhage.     Ventricles: Normal.  Paranasal sinuses: Clear.   Mastoid air cells: Clear.    Osseous: No acute findings.  Orbits: Normal.    Soft tissues: Unremarkable.       Impression    Impression:  No acute intracranial abnormality.      JERALD ALVAREZ MD         SYSTEM ID:  [id number]   CT Cervical Spine w/o Contrast    Narrative    Exam: CT CERVICAL SPINE W/O CONTRAST    Exam reason: Fall    Technique: Using helical technique, axial images of the cervical spine  were obtained.  Coronal and sagittal reformations were generated.  No  IV contrast. This CT was performed using one or more of the following  dose reduction techniques: automated exposure control, adjustment of  the mA and/or kV according to patient size, and/or use of iterative  reconstruction technique.    Comparison: 7/29/2020    FINDINGS:    Osseous:     There is grade 1 anterolisthesis of C3 on C4. There is slight reversal  of the typical cervical lordosis centered at C4.    No acute fracture or or traumatic subluxation.    There are multilevel degenerative changes of the cervical spine  including moderate to severe multilevel facet arthropathy. There is a  severe C1-C2 degenerative change.    Prevertebral soft tissues: Unremarkable    Lung  apices: No pneumothorax or consolidation in visualized portions.      Impression    IMPRESSION:    No acute fracture or subluxation.    JERALD ALVAREZ MD         SYSTEM ID:  [id number]   Radius/Ulna XR, PA & LAT, right    Narrative    Exam: XR FOREARM RIGHT 2 VIEWS, XR HAND RIGHT G/E 3 VIEWS    Technique: Right forearm, 2 views, and right hand, 3 views    Comparison: None.    Exam reason: Fall    Findings:  There is a mildly impacted and slightly comminuted fracture of the  distal radial metaphysis with slight dorsal angulation of the distal  fracture fragment. Moderate degenerative changes of the first CMC  joint. There is diffuse osteopenia.    There is soft tissue swelling about the wrist.       Impression    Impression:  Mildly impacted and slightly comminuted fracture of the distal radial  metaphysis.    JERALD ALVAREZ MD         SYSTEM ID:  [id number]   XR Hand Right G/E 3 Views    Narrative    Exam: XR FOREARM RIGHT 2 VIEWS, XR HAND RIGHT G/E 3 VIEWS    Technique: Right forearm, 2 views, and right hand, 3 views    Comparison: None.    Exam reason: Fall    Findings:  There is a mildly impacted and slightly comminuted fracture of the  distal radial metaphysis with slight dorsal angulation of the distal  fracture fragment. Moderate degenerative changes of the first CMC  joint. There is diffuse osteopenia.    There is soft tissue swelling about the wrist.       Impression    Impression:  Mildly impacted and slightly comminuted fracture of the distal radial  metaphysis.    JERALD ALVAREZ MD         SYSTEM ID:  [id number]   Foot XR, G/E 3 views, left    Narrative    Exam: XR FOOT LEFT G/E 3 VIEWS    Technique: Left foot, 3 Views    Comparison: None.    Exam reason: Fall    Findings:  No acute fracture or dislocation. Mild first MTP degenerative change.  Diffuse osteopenia.    Soft tissues appear normal.      Impression    Impression:  No acute fracture or dislocation.    JERALD ALVAREZ MD         SYSTEM ID:  [id number]    XR Wrist Port Right 2 Views    Narrative    Exam: XR WRIST PORT RIGHT 2 VIEWS    Technique: Right wrist, 2 views    Comparison: 5/21/2024    Exam reason: Post reduction    Findings:  Interval reduction and splinting of the distal radius fracture. Splint  material obscures fine bony detail. Slight dorsal angulation appears  slightly improved.      Impression    Impression:  Interval reduction and splinting of the distal radius fracture.    JERALD ALVAREZ MD         SYSTEM ID:  [id number]       Medications   BUPivacaine (MARCAINE) 0.5% preservative free injection (50 mg Intradermal $Given by Other 8/21/24 1133)   potassium chloride jayne ER (KLOR-CON M20) CR tablet 40 mEq (40 mEq Oral $Given 8/21/24 1653)   Tdap (tetanus-diphtheria-acell pertussis) (ADACEL) injection 0.5 mL (0.5 mLs Intramuscular $Given 8/21/24 1653)       Assessments & Plan (with Medical Decision Making)     I have reviewed the nursing notes.    I have reviewed the findings, diagnosis, plan and need for follow up with the patient.    Summary:  Patient presents to the ER today for fall.  Potential diagnosis which have been considered and evaluated include intracerebral bleed, skull fracture, cervical fracture/subluxation, fractures, contusions, intraperitoneal injury/bleed, as well as others. Many of these have been excluded using the various modalities and assessment as noted on the chart. At the present time, the diagnosis given seems to be the most likely head contusion with nasal abrasion, distal radius fracture, contusion left great toe.  Upon arrival, vitals signs show blood pressure 95/59 with a pulse of 86.  Temperature 36.9  C.  Respirations 18 with oxygenation 99% on room air.  Trauma evaluation was conducted on arrival and no trauma team activation required.  The patient is alert and oriented.  No scalp indentations, soft spots, tenderness to palpation.  Does have hematoma to right cheek and abrasion to the bridge of the nose.  No  neurological abnormalities.  Swelling and bruising to right arm with tenderness to the right wrist.  Distal range of motion intact and capillary refill intact to the fingers of the right hand.  Bruising to the left toe with some tenderness.  Range of motion intact.  No crepitus or deformities.  Lab work obtained due to mildly low blood pressure on arrival showing WBC of 12.6 with hemoglobin of 15.6.  Sodium 134 with a potassium of 3.1.  Anion gap is mildly elevated 18.  Renal functions benign.  INR slightly elevated at 1.25.  APTT 33.  CT of the head conducted showing no obvious abnormality.  CT of cervical spine shows no fracture or subluxation.  X-ray of left foot personally reviewed showing no fracture dislocation.  X-ray of right forearm/hand shows distal radius fracture that is comminuted.  Potassium KCl 40 mEq given for potassium of 3.1.  Hematoma block conducted and patient placed in Chinese fingertrap for reduction.  Volar short arm splint with mild ulnar deviation placed.  Repeat x-ray conducted showing improved alignment.  Will discharge patient home to do acetaminophen/ibuprofen.  PRICE therapy recommended.  Wound care instructions given.  Close follow-up with orthopedics in 1 week.  Return to ER for new or worsening symptoms.  Patient verbalized understand agrees plan of care.  Patient discharged home.      Critical Care Time: None    Impression and plan discussed with patient. Questions answered, concerns addressed, indications for urgent re-evaluation reviewed, and  given. Patient/Parent/Caregiver agree with treatment plan and have no further questions at this time.  AVS provided at discharge.    This note was created by the Dragon Voice Dictation System. Inadvertent typographical errors, due to software recognition problems, may still exist.             New Prescriptions    No medications on file       Final diagnoses:   Fall at home, initial encounter   Facial abrasion, initial encounter    Traumatic hematoma of cheek, initial encounter   Contusion of left great toe without damage to nail, initial encounter   Contusion of right forearm, initial encounter   Closed fracture of distal end of right radius, unspecified fracture morphology, initial encounter   Hypokalemia       8/21/2024   [facility]       Dougie Tucker, LAVERNE CNP  08/21/24 [id number]

## 2024-08-21 NOTE — DISCHARGE INSTRUCTIONS
Splint:   You have been placed in a temporary splint of your extremity. Use caution NOT to get this wet. Observe for signs of splint fitment issues, which can arise with swelling and injury.  These symptoms include numbness, coolness, color change, pain in fingers/toes.  If these develop, loosen the ACE wrap and follow up immediately.   For pain control, it is advised to use only acetaminophen (Tylenol).  After injury, you can use any of the NSAID's listed below, but only for 1-2 days, then switch to acetaminophen only.  The use of NSAID's such as aspirin, ibuprofen, naproxen, or Aleve reduces inflammation.  Inflammation is needed for bone healing.   Ice the injured area every 3-4 hours for 20 minutes.  Be sure to place a barrier between the ice and skin so frostbite doesn't occur.   Elevate the injured area above the level of your heart when you are not up and about.    Wound care:   Wash your wound 2 times each day with soap and water.  After this, apply antibiotic ointment and a dressing (such as a Band-Aid) for 2-3 days.  Otherwise, keep your wound clean and dry.  This means no swimming or extended submersion in water until the wound is completely healed.  Remember, all wounds will leave some sort of scar.    Signs of infection:   Watch for signs of infection which include severe pain at site, increasing redness with pain, pus colored drainage, or if you develop a fever.  If this occurs, come back in for re-evaluation and most likely antibiotic therapy.       Pain control:   If your past medical conditions, allergies, current medications, or current status does not prevent you from using acetaminophen and/or ibuprofen, use the following:   Acetaminophen 650-1000 mg every 6 hours as needed for pain in addition to ibuprofen 400 mg every 6 hours as needed for pain.  Take these two medications together if wanted.    Remember that these are for AS NEEDED.  If not needed, do not take.

## 2024-10-02 ENCOUNTER — APPOINTMENT (OUTPATIENT)
Dept: GENERAL RADIOLOGY | Facility: HOSPITAL | Age: 74
End: 2024-10-02
Attending: NURSE PRACTITIONER
Payer: COMMERCIAL

## 2024-10-02 ENCOUNTER — APPOINTMENT (OUTPATIENT)
Dept: PHYSICAL THERAPY | Facility: HOSPITAL | Age: 74
End: 2024-10-02
Attending: NURSE PRACTITIONER
Payer: COMMERCIAL

## 2024-10-02 ENCOUNTER — HOSPITAL ENCOUNTER (OUTPATIENT)
Facility: HOSPITAL | Age: 74
Setting detail: OBSERVATION
Discharge: NURSING, REHAB OR LONG-TERM CARE | End: 2024-10-04
Attending: NURSE PRACTITIONER | Admitting: INTERNAL MEDICINE
Payer: COMMERCIAL

## 2024-10-02 DIAGNOSIS — M25.562 LEFT KNEE PAIN: Primary | ICD-10-CM

## 2024-10-02 DIAGNOSIS — M15.9 GENERALIZED OSTEOARTHRITIS: ICD-10-CM

## 2024-10-02 DIAGNOSIS — R26.81 UNSTABLE GAIT: ICD-10-CM

## 2024-10-02 DIAGNOSIS — M25.562 LEFT KNEE PAIN, UNSPECIFIED CHRONICITY: ICD-10-CM

## 2024-10-02 DIAGNOSIS — R53.1 GENERALIZED WEAKNESS: ICD-10-CM

## 2024-10-02 LAB
ALBUMIN SERPL BCG-MCNC: 3.6 G/DL (ref 3.5–5.2)
ALP SERPL-CCNC: 105 U/L (ref 40–150)
ALT SERPL W P-5'-P-CCNC: 11 U/L (ref 0–50)
ANION GAP SERPL CALCULATED.3IONS-SCNC: 14 MMOL/L (ref 7–15)
AST SERPL W P-5'-P-CCNC: 17 U/L (ref 0–45)
BILIRUB SERPL-MCNC: 0.9 MG/DL
BUN SERPL-MCNC: 10.3 MG/DL (ref 8–23)
CALCIUM SERPL-MCNC: 9.5 MG/DL (ref 8.8–10.4)
CHLORIDE SERPL-SCNC: 101 MMOL/L (ref 98–107)
CREAT SERPL-MCNC: 0.86 MG/DL (ref 0.51–0.95)
EGFRCR SERPLBLD CKD-EPI 2021: 70 ML/MIN/1.73M2
ERYTHROCYTE [DISTWIDTH] IN BLOOD BY AUTOMATED COUNT: 14.5 % (ref 10–15)
GLUCOSE SERPL-MCNC: 110 MG/DL (ref 70–99)
HCO3 SERPL-SCNC: 23 MMOL/L (ref 22–29)
HCT VFR BLD AUTO: 47.3 % (ref 35–47)
HGB BLD-MCNC: 16 G/DL (ref 11.7–15.7)
MCH RBC QN AUTO: 31.4 PG (ref 26.5–33)
MCHC RBC AUTO-ENTMCNC: 33.8 G/DL (ref 31.5–36.5)
MCV RBC AUTO: 93 FL (ref 78–100)
PLATELET # BLD AUTO: 239 10E3/UL (ref 150–450)
POTASSIUM SERPL-SCNC: 3 MMOL/L (ref 3.4–5.3)
PROT SERPL-MCNC: 6.6 G/DL (ref 6.4–8.3)
RBC # BLD AUTO: 5.09 10E6/UL (ref 3.8–5.2)
SODIUM SERPL-SCNC: 138 MMOL/L (ref 135–145)
WBC # BLD AUTO: 10.5 10E3/UL (ref 4–11)

## 2024-10-02 PROCEDURE — 99213 OFFICE O/P EST LOW 20 MIN: CPT | Performed by: NURSE PRACTITIONER

## 2024-10-02 PROCEDURE — 73562 X-RAY EXAM OF KNEE 3: CPT | Mod: LT

## 2024-10-02 PROCEDURE — 82435 ASSAY OF BLOOD CHLORIDE: CPT | Performed by: INTERNAL MEDICINE

## 2024-10-02 PROCEDURE — 36415 COLL VENOUS BLD VENIPUNCTURE: CPT | Performed by: INTERNAL MEDICINE

## 2024-10-02 PROCEDURE — G0463 HOSPITAL OUTPT CLINIC VISIT: HCPCS | Mod: 25

## 2024-10-02 PROCEDURE — 85027 COMPLETE CBC AUTOMATED: CPT | Performed by: INTERNAL MEDICINE

## 2024-10-02 PROCEDURE — 97162 PT EVAL MOD COMPLEX 30 MIN: CPT | Mod: GP | Performed by: PHYSICAL THERAPIST

## 2024-10-02 PROCEDURE — 250N000013 HC RX MED GY IP 250 OP 250 PS 637: Performed by: INTERNAL MEDICINE

## 2024-10-02 PROCEDURE — 999N000104 HC STATISTIC NO CHARGE

## 2024-10-02 PROCEDURE — 99222 1ST HOSP IP/OBS MODERATE 55: CPT | Mod: AI | Performed by: INTERNAL MEDICINE

## 2024-10-02 PROCEDURE — G0378 HOSPITAL OBSERVATION PER HR: HCPCS

## 2024-10-02 PROCEDURE — G0463 HOSPITAL OUTPT CLINIC VISIT: HCPCS

## 2024-10-02 PROCEDURE — 250N000013 HC RX MED GY IP 250 OP 250 PS 637: Performed by: NURSE PRACTITIONER

## 2024-10-02 RX ORDER — ONDANSETRON 2 MG/ML
4 INJECTION INTRAMUSCULAR; INTRAVENOUS EVERY 6 HOURS PRN
Status: DISCONTINUED | OUTPATIENT
Start: 2024-10-02 | End: 2024-10-04 | Stop reason: HOSPADM

## 2024-10-02 RX ORDER — NALOXONE HYDROCHLORIDE 0.4 MG/ML
0.4 INJECTION, SOLUTION INTRAMUSCULAR; INTRAVENOUS; SUBCUTANEOUS
Status: DISCONTINUED | OUTPATIENT
Start: 2024-10-02 | End: 2024-10-04 | Stop reason: HOSPADM

## 2024-10-02 RX ORDER — OXYCODONE HCL 10 MG/1
10 TABLET, FILM COATED, EXTENDED RELEASE ORAL 2 TIMES DAILY PRN
COMMUNITY

## 2024-10-02 RX ORDER — LEVOTHYROXINE SODIUM 25 UG/1
50 TABLET ORAL DAILY
Status: DISCONTINUED | OUTPATIENT
Start: 2024-10-02 | End: 2024-10-04 | Stop reason: HOSPADM

## 2024-10-02 RX ORDER — ACETAMINOPHEN 650 MG/1
650 SUPPOSITORY RECTAL EVERY 4 HOURS PRN
Status: DISCONTINUED | OUTPATIENT
Start: 2024-10-02 | End: 2024-10-04 | Stop reason: HOSPADM

## 2024-10-02 RX ORDER — TOLTERODINE 4 MG/1
4 CAPSULE, EXTENDED RELEASE ORAL DAILY
Status: DISCONTINUED | OUTPATIENT
Start: 2024-10-03 | End: 2024-10-04 | Stop reason: HOSPADM

## 2024-10-02 RX ORDER — ACETAMINOPHEN 325 MG/1
650 TABLET ORAL ONCE
Status: COMPLETED | OUTPATIENT
Start: 2024-10-02 | End: 2024-10-02

## 2024-10-02 RX ORDER — AMOXICILLIN 250 MG
1 CAPSULE ORAL 2 TIMES DAILY PRN
Status: DISCONTINUED | OUTPATIENT
Start: 2024-10-02 | End: 2024-10-04 | Stop reason: HOSPADM

## 2024-10-02 RX ORDER — NALOXONE HYDROCHLORIDE 0.4 MG/ML
0.2 INJECTION, SOLUTION INTRAMUSCULAR; INTRAVENOUS; SUBCUTANEOUS
Status: DISCONTINUED | OUTPATIENT
Start: 2024-10-02 | End: 2024-10-04 | Stop reason: HOSPADM

## 2024-10-02 RX ORDER — TOLTERODINE 4 MG/1
4 CAPSULE, EXTENDED RELEASE ORAL DAILY
COMMUNITY
Start: 2024-05-23

## 2024-10-02 RX ORDER — CHOLESTYRAMINE 4 G/9G
1 POWDER, FOR SUSPENSION ORAL DAILY
Status: ON HOLD | COMMUNITY
Start: 2024-06-05 | End: 2024-10-02 | Stop reason: SINTOL

## 2024-10-02 RX ORDER — OXYCODONE HYDROCHLORIDE 5 MG/1
10 TABLET ORAL EVERY 4 HOURS PRN
Status: DISCONTINUED | OUTPATIENT
Start: 2024-10-02 | End: 2024-10-04 | Stop reason: HOSPADM

## 2024-10-02 RX ORDER — ACETAMINOPHEN 325 MG/1
650 TABLET ORAL EVERY 4 HOURS PRN
Status: DISCONTINUED | OUTPATIENT
Start: 2024-10-02 | End: 2024-10-04 | Stop reason: HOSPADM

## 2024-10-02 RX ORDER — AMOXICILLIN 250 MG
2 CAPSULE ORAL 2 TIMES DAILY PRN
Status: DISCONTINUED | OUTPATIENT
Start: 2024-10-02 | End: 2024-10-04 | Stop reason: HOSPADM

## 2024-10-02 RX ORDER — OXYCODONE HYDROCHLORIDE 5 MG/1
5 TABLET ORAL EVERY 4 HOURS PRN
Status: DISCONTINUED | OUTPATIENT
Start: 2024-10-02 | End: 2024-10-04 | Stop reason: HOSPADM

## 2024-10-02 RX ORDER — DIPHENOXYLATE HYDROCHLORIDE AND ATROPINE SULFATE 2.5; .025 MG/1; MG/1
1 TABLET ORAL EVERY 6 HOURS PRN
Status: ON HOLD | COMMUNITY
Start: 2023-11-28 | End: 2024-10-02

## 2024-10-02 RX ORDER — ONDANSETRON 4 MG/1
4 TABLET, ORALLY DISINTEGRATING ORAL EVERY 6 HOURS PRN
Status: DISCONTINUED | OUTPATIENT
Start: 2024-10-02 | End: 2024-10-04 | Stop reason: HOSPADM

## 2024-10-02 RX ORDER — SILVER SULFADIAZINE 10 MG/G
CREAM TOPICAL DAILY PRN
COMMUNITY

## 2024-10-02 RX ADMIN — ACETAMINOPHEN 650 MG: 325 TABLET ORAL at 13:45

## 2024-10-02 RX ADMIN — LEVOTHYROXINE SODIUM 50 MCG: 0.03 TABLET ORAL at 23:40

## 2024-10-02 ASSESSMENT — ACTIVITIES OF DAILY LIVING (ADL)
ADLS_ACUITY_SCORE: 35
ADLS_ACUITY_SCORE: 31
ADLS_ACUITY_SCORE: 35
ADLS_ACUITY_SCORE: 31
ADLS_ACUITY_SCORE: 31
ADLS_ACUITY_SCORE: 35
ADLS_ACUITY_SCORE: 31
ADLS_ACUITY_SCORE: 35
ADLS_ACUITY_SCORE: 31

## 2024-10-02 ASSESSMENT — ENCOUNTER SYMPTOMS
FEVER: 0
SHORTNESS OF BREATH: 0
DIARRHEA: 0
CHILLS: 0
VOMITING: 0
PSYCHIATRIC NEGATIVE: 1
NAUSEA: 0

## 2024-10-02 NOTE — MEDICATION SCRIBE - ADMISSION MEDICATION HISTORY
Medication Scribe Admission Medication History    Admission medication history is complete. The information provided in this note is only as accurate as the sources available at the time of the update.    Information Source(s): [facility], [facility], and [facility]  via in-person    Pertinent Information:   Patient manages her own medications. Patient could not recall some information about her PTA medications. [facility] patient- chart reviewed (patient confirmed good source for information regarding medications), dispense hx checked. Phone call placed to pharmacy for further information.   Conflicting information about medications patient reports taking and fill history:   Vesicare- listed at [facility] as active, listed on patient's personal medication list, patient reports taking (but recently ran out), has not been filled for >5 years. Detrol LA- also listed at [facility] as active, not listed on patient's personal medication list, patient reports not taking, last filled 5/23/24 90 caps. Pt did report she takes only one medication for overactive bladder.   Oxycodone- pt reports still having an Rx that she occasionally takes for bad RA flares. Confirmed fill with [facility]- last filled 6/14/2014. Listed as an allergy- pt reports no reaction from this medication and it is also not listed as an allergy with her PCP, removed allergy from list.     Changes made to PTA medication list:  Added: elavil, biotin, silver sulfadene  Deleted:   prilosec, nystatin, magnesium, hydrochlorothiazide, prolia, hemp seed,  xanax (not on [facility] medication list, not filled within the past year)  Questran powder- pt reports she took one dose and experienced worsening of diarrhea and discontinued use. Still active at .   Santyl, mupirocin, elocon- pt reports old therapies that she has completed or no longer takes.   Lomotil- reports therapy was completed. Is not currently taking anything for diarrhea.   Changed:    Updated detrol LA  Input time of day if able.     Allergies reviewed with patient and updates made in EHR: yes- pt unsure of most allergies.    Medication History Completed By: Misa Salinas 10/2/2024 4:58 PM    PTA Med List   Medication Sig Note Last Dose    amitriptyline (ELAVIL) 25 MG tablet Take 25 mg by mouth at bedtime.  10/1/2024 at HS    atorvastatin (LIPITOR) 20 MG tablet Take 20 mg by mouth every evening. 10/2/2024: Ran out 3 days ago.  Past Week at PM    BIOTIN PO Take 1 capsule by mouth daily. For hair loss.  Past Week    levothyroxine (SYNTHROID/LEVOTHROID) 50 MCG tablet Take 50 mcg by mouth every morning.  10/1/2024 at AM    metoprolol succinate ER (TOPROL-XL) 50 MG 24 hr tablet Take 50 mg by mouth daily  10/1/2024 at midday    oxyCODONE (OXYCONTIN) 10 MG 12 hr tablet Take 10 mg by mouth 2 times daily as needed for severe pain. 10/2/2024: Last filled 6/14/2014 30 tabs at [facility] (old rx pt reports still has and takes when needed) More than a month    prochlorperazine (COMPAZINE) 10 MG tablet Take 10 mg by mouth 2 times daily as needed for nausea or vomiting.  Past Week    silver sulfADIAZINE (SILVADENE) 1 % external cream Apply topically daily as needed (irritation under breasts and pannus). 10/2/2024: Last filled 11/08/2021 at [facility]     solifenacin (VESICARE) 5 MG tablet Take 5 mg by mouth daily 10/2/2024: Last filled 2014 at [facility]- pt thinks this is the only med she takes for overactive bladder, but fill hx does not correlate with report.      SUMAtriptan (IMITREX) 100 MG tablet Take 100 mg by mouth at onset of headache for migraine  More than a month    tolterodine ER (DETROL LA) 4 MG 24 hr capsule Take 4 mg by mouth daily. 10/2/2024: Last filled 5/23/24.

## 2024-10-02 NOTE — ED NOTES
Care Transitions focused note:      Chart reviewed,  provider requesting I see 74 year old female who is having trouble with her knee and is unable to manage in the home due to her condition with the knee and also right wrist injury.  She is not able to safely manage her activities of daily living.    Met with patient, she would be interested in going to [facility].  I did contact admissions nurse Serena Mccray and faxed facesheet to her.  I also contacted Dr Heart's nurse Annie and she will fax over the most recent office notes to them.     Anticipate she will be ok to go there tomorrow.  Has [facility] which will cover.     Updated provider and nsg staff. Pt will need to board until accepted.  Nursing supervisor notified  Awaiting therapy notes to fax over to [facility].    PAS will be completed upon acceptance.    Will follow    BEE Thurman

## 2024-10-02 NOTE — PROGRESS NOTES
"   10/02/24 1426   Appointment Info   Signing Clinician's Name / Credentials (PT) Janay Jerry DPT   Quick Adds   Quick Adds Certification   Living Environment   People in Home alone   Current Living Arrangements house   Home Accessibility stairs to enter home   Number of Stairs, Main Entrance 5   Stair Railings, Main Entrance railing on right side (ascending)   Transportation Anticipated family or friend will provide;car, drives self   Living Environment Comments Prior to her fall in August when she was driving but since has to rely on family/friends/PCA for transportation   Self-Care   Usual Activity Tolerance fair   Current Activity Tolerance poor   Fall history within last six months yes   Number of times patient has fallen within last six months 1   Activity/Exercise/Self-Care Comment Prior to fall in August pt was independent with ADLs but did have PCA services for cleaning, cooking, laundry, etc.  Since her fall in August pt now requirest assist with bathing & dressing and states she only has those completed on Wednesdays & Fridays, otherwise she stays in her same clothes until the PCA returns.  Pt has been doing some walking with single cane in her home but also has a manual w/c that she scoots around in.  Prior to fall in August she would use 2 canes outside her home but states she can't do that at this time with the splint on her R UE.   General Information   Onset of Illness/Injury or Date of Surgery 10/02/24   Referring Physician Selina Ledezma NP   Patient/Family Therapy Goals Statement (PT) Pt states she unable to safely return home   Pertinent History of Current Problem (include personal factors and/or comorbidities that impact the POC) Pt comes to urgent care with complaints of left knee pain and inability to ambulate.  Of note, pt had a fall on 8/21/24 resulting in a right wrist fracture and is currently splinted and in a sling.  Pt reports that her knee \"goes out\" periodically and has for " "several years but states it typically goes \"back in\" and she is then able to ambulate again quickly.  She was told about 3 years ago that she needed to have her knee replaced.   Weight-Bearing Status - RUE nonweight-bearing   General Observations Pt appears unkept, R UE in sling   Cognition   Affect/Mental Status (Cognition) WFL   Orientation Status (Cognition) oriented x 3   Follows Commands (Cognition) WFL   Pain Assessment   Patient Currently in Pain Yes, see Vital Sign flowsheet  (4/10 left knee)   Integumentary/Edema   Integumentary/Edema Comments mild edema noted in left knee   Posture    Posture Forward head position;Protracted shoulders   Range of Motion (ROM)   ROM Comment L knee extension limited due to pain   Strength (Manual Muscle Testing)   Strength Comments Strength limited by pain in L LE - unable to accurately assess MMT   Transfers   Transfers sit-stand transfer   Sit-Stand Transfer   Sit-Stand [place] (Transfers) moderate assist (50% patient effort)   Comment, (Sit-Stand Transfer) up to FWW for balance only, pt rested elbow on walker for support, not putting weight through wrist.   Gait/Stairs (Locomotion)   Comment, (Gait/Stairs) unable to assess, unable to tolerate taking a step forward due to pain   Balance   Balance Comments fair with support from walker   Clinical Impression   Criteria for Skilled Therapeutic Intervention Yes, treatment indicated   PT Diagnosis (PT) gait disturbance   Influenced by the following impairments pain, weakness, decreased activity tolerance, decreased balance   Functional limitations due to impairments decreased tolerance for functional mobility and ADLs   Clinical Presentation (PT Evaluation Complexity) evolving   Clinical Presentation Rationale clinical judgment   Clinical Decision Making (Complexity) moderate complexity   Planned Therapy Interventions (PT) balance training;bed mobility training;gait training;neuromuscular re-education;patient/family " education;stair training;strengthening;transfer training;progressive activity/exercise;risk factor education   Risk & Benefits of therapy have been explained evaluation/treatment results reviewed;care plan/treatment goals reviewed;risks/benefits reviewed;participants included;patient   Clinical Impression Comments PT evaluation completed.  Pt lives at home alone and prior to fall in August resulting in right wrist fracture, pt was largely independent.  But has been getting assist 2 days/week since fall for ADLs and had limited mobility.  Pt presents today with onset of knee pain, something she has had trouble with off/on in the past and typically recovers very quickly but this time is not resolving and she is unable to tolerate ambulation without use of R UE as well.  Pt is not safe to return home at this time and will require short term rehab upon discharge.  Will see during stay to progress mobility as able   PT Total Evaluation Time   PT Eval, Moderate Complexity Minutes (83095) 15   Therapy Certification   Start of care date 10/02/24   Certification date from 10/02/24   Certification date to 10/16/24   Medical Diagnosis unstable gait, generalized weakness, left knee pain   Physical Therapy Goals   PT Frequency 6x/week   PT Predicted Duration/Target Date for Goal Attainment 10/16/24   PT Goals Bed Mobility;Transfers;Gait;Stairs   PT: Bed Mobility Modified independent;Supine to/from sit   PT: Transfers Modified independent;Sit to/from stand;Bed to/from chair;Assistive device   PT: Gait Modified independent;Straight cane;50 feet   PT: Stairs Modified independent;5 stairs;Rail on right   PT Discharge Planning   PT Plan progress mobility as tolerated   PT Discharge Recommendation (DC Rec) Transitional Care Facility   PT Rationale for DC Rec PT evaluation completed. Pt lives at home alone and prior to fall in August resulting in right wrist fracture, pt was largely independent. But has been getting assist 2 days/week  since fall for ADLs and had limited mobility. Pt presents today with onset of knee pain, something she has had trouble with off/on in the past and typically recovers very quickly but this time is not resolving and she is unable to tolerate ambulation without use of R UE as well. Pt is not safe to return home at this time and will require short term rehab upon discharge. Will see during stay to progress mobility as able   PT Brief overview of current status sit>stand mod A, unable to attempt steps due to knee pain   Total Session Time   Total Session Time (sum of timed and untimed services) 15   I certify the need for these services furnished under this plan of treatment and while under my care. (Physician co-signature of this document indicates review and certification of the therapy plan).

## 2024-10-02 NOTE — ED NOTES
Care Transitions focused note:      Patient will be going to Med/Surg bed.  Notified Care Transitions Staff.      PAS and Transportation will need to be set up upon acceptance.    Medication list faxed to [facility]    Awaiting PT note to fax.    BEE Thurman

## 2024-10-02 NOTE — H&P
[facility]    History and Physical - Hospitalist Service       Date of Admission:  10/2/2024    Assessment & Plan      Nishi Santoyo is a 74 year old female admitted on 10/2/2024.      1.  Left knee pain: Patient has known osteoarthritis of that left knee she was getting up off the toilet a day or so ago felt a sharp pain in that knee with significant pain afterwards.  Tolerated for day or so and then came in for evaluation.  X-ray shows no acute fracture does have osteoarthritis noted.  She is tender on the lateral portion.  Certainly does have a mild effusion.  Could have tore a meniscus.  At any rate she is not able to ambulate safely given the fact she has a left wrist fracture and with in a splint.  She was seen by physical therapy recommended that she go to rehab.  Unable to be placed today.  She will be admitted observation status to the hope that this will occur tomorrow.  She should be seen by orthopedic surgery regarding this knee perhaps a injection or she may require surgical invention at some point.  Pain control be offered.  Ice.  PT will see the patient once again.  Case management will be involved also.    2.  History of hypertension: Patient supposed to be on some Toprol and hydrochlorothiazide.  She is not sure she has been taking any of those for some time.  Current blood pressures have been 104/72 obviously does not need this at this time we will hold any medications and monitor her.    3.  Tobacco use disorder: Patient declines any NicoDerm patch while here.  Her sats are fine room air 97%.  Lungs are clear at this time.    4.  History of rheumatoid arthritis.  No evidence of any acute flares.  She is not on any disease modifying agents.    5.  Distal radial fracture in the right: This occurred after a fall towards the middle of August.  She presented to the ER on August 21 x-rays show this mildly impacted slightly comminuted fracture of the distal radial metaphysis.  A splint was  placed.  Recommendations to be followed up within a couple weeks.  She has not done so since that time.  Still has the original splint in place.  CMS is intact.  Will have her seen by OT but also needs to have a follow-up appointment with orthopedic surgery as an outpatient regarding her knee and this wrist.     Diet:  Regular  DVT Prophylaxis: Pneumatic Compression Devices  Edge Catheter: Not present  Lines: None     Cardiac Monitoring: None  Code Status:  Full code                     Disposition Plan     Medically Ready for Discharge: Anticipated Tomorrow           Marcos Rosa MD  Hospitalist Service  [facility]  Securely message with [facility] (more info)  Text page via [facility] Paging/Directory     ______________________________________________________________________    Chief Complaint   Left knee pain    History is obtained from the patient, electronic health record, and emergency department physician    History of Present Illness   Nishi Santoyo is a 74 year old female who presented to urgent care today for evaluation of left knee pain starting there is a little bit but she says 2 days ago she got up off the bathroom toilet and had severe pain in her left lateral knee.  She was able to limp around the house for the next day or so however continued to be significantly painful she also had a recent right wrist fracture back on August 21 and still is in the splint.  So she been more immobile.  So she decided to come to the urgent care center.  In the urgent care they did do an x-ray which does show significant osteoarthritis of that left knee.  No acute fractures.  They were going to discharge her home however she stated that she is unable to be at home by herself she is not able to ambulate at all.  She has no one to help her at all.  Physical therapy apparently came down and saw her in the ER and they felt given the fact that she has the right wrist fracture she is unable to safely ambulate.   And is not safe to return home at this time.  They are recommending short-term rehab.   was notified in the ER and they did send some information to [facility] regarding potential admission.  That will not occur today.  But potentially tomorrow.  She is not safe to return home therefore she is being admitted observation status.    Speak with the patient sitting at rest her knee is not all that sore at all.  Her right wrist is doing well.  She has not had this evaluated since her initial fracture back on August 21.  She denies any chest pains, palpitations or syncope.  No shortness of breath cough purulent sputum she is a daily smoker.  No recent fevers chills sweats.  No abdominal pain.  No trouble with her bladder though she does take Vesicare.  Does have history some IBS also.  Eyes any bleeding issues.  Appetites been good no heartburn at all.  Prior to this injury to her knee she was able to get around with a cane and wheelchair.  She has no close family members or friends who can help her out or stay with her.  She does have home care that comes in just 2 times a week.  In talking with her about her medications she states most of them she has not been taking because she ran out.  I did ask her about a NicoDerm patch while she is in the hospital she states that she does not need it.    Past Medical History    Past Medical History:   Diagnosis Date    Arthritis     RA, osteoarthritis    Diabetes (H)     Hypertension     Obese     Renal disease        Past Surgical History   Past Surgical History:   Procedure Laterality Date    BACK SURGERY  2014    spine fusion surgery    BREAST SURGERY  1980    bilateral breast reductions    CHOLECYSTECTOMY      COLONOSCOPY  08/04/2017    ORTHOPEDIC SURGERY  2003    left total hip    ORTHOPEDIC SURGERY  2004    right total knee    ORTHOPEDIC SURGERY  2005    right total shoulder    ORTHOPEDIC SURGERY  2006    finger and hand joint replacements     PHACOEMULSIFICATION WITH STANDARD INTRAOCULAR LENS IMPLANT Left 3/7/2019    Procedure: CATARACT EXTRACTION WITH IMPLANT LEFT;  Surgeon: Jorge Rosa MD;  Location: [facility] OR    PHACOEMULSIFICATION WITH STANDARD INTRAOCULAR LENS IMPLANT Right 4/4/2019    Procedure: CATARACT RIGHT EYE WITH IMPLANT;  Surgeon: Jorge Rosa MD;  Location: [facility] OR       Prior to Admission Medications   Prior to Admission Medications   Prescriptions Last Dose Informant Patient Reported? Taking?   ALPRAZolam (XANAX) 1 MG tablet   Yes No   Sig: Take 1 mg by mouth 3 times daily as needed for anxiety   Cholecalciferol (VITAMIN D3) 2000 units CAPS   Yes No   Sig: Take 2 capsules by mouth daily   SUMAtriptan (IMITREX) 100 MG tablet   Yes No   Sig: Take 100 mg by mouth at onset of headache for migraine   TOLTERODINE TARTRATE PO   Yes No   Sig: Take 4 mg by mouth daily   atorvastatin (LIPITOR) 20 MG tablet   Yes No   Sig: Take 20 mg by mouth daily   collagenase (SANTYL) 250 UNIT/GM external ointment   Yes No   Sig: Apply topically daily   denosumab (PROLIA) 60 MG/ML SOLN injection   Yes No   Sig: Inject 60 mg Subcutaneous every 6 months   hemp seed oil   Yes No   hydrochlorothiazide (HYDRODIURIL) 12.5 MG tablet   Yes No   Sig: Take 25 mg by mouth daily   levothyroxine (SYNTHROID/LEVOTHROID) 50 MCG tablet   Yes No   Sig: Take 50 mcg by mouth daily   magnesium chloride 535 (64 Mg) MG TBEC CR tablet   Yes No   Sig: Take 535 mg by mouth daily   metoprolol succinate ER (TOPROL-XL) 50 MG 24 hr tablet   Yes No   Sig: Take 50 mg by mouth daily   mometasone (ELOCON) 0.1 % external cream   Yes No   Sig: Apply topically daily   mupirocin (BACTROBAN) 2 % external ointment   Yes No   Sig: Apply topically 3 times daily   nystatin (MYCOSTATIN) 478130 UNIT/GM external powder   Yes No   omeprazole (PRILOSEC) 40 MG DR capsule   Yes No   Sig: Take 40 mg by mouth daily   oxyCODONE (OXYCONTIN) 10 MG 12 hr tablet   Yes No   Sig: Take 10 mg by mouth every 12 hours    prochlorperazine (COMPAZINE) 10 MG tablet   Yes No   Sig: Take 10 mg by mouth every 6 hours as needed for nausea or vomiting   solifenacin (VESICARE) 5 MG tablet   Yes No   Sig: Take 5 mg by mouth daily      Facility-Administered Medications: None        Review of Systems    The 10 point Review of Systems is negative other than noted in the HPI or here.      Social History   I have reviewed this patient's social history and updated it with pertinent information if needed.  Social History     Tobacco Use    Smoking status: Every Day    Smokeless tobacco: Never   Substance Use Topics    Alcohol use: Yes         Family History      Mother with breast cancer.      Allergies   Allergies   Allergen Reactions    Aspirin GI Disturbance    Clarithromycin GI Disturbance     Biaxin    Gabapentin      [facility]urontin; [facility]    Ibuprofen GI Disturbance    Lisinopril GI Disturbance    Metformin Diarrhea    Minocycline      Passed out    Oxycodone Hcl      OxyContin; Not listed on [facility] 1/11/19    Prednisone Swelling     Can't recall, says it wasn't severe or I would have remembered; cannot recall what was swelling-facial cheeks  Provider wants to try the prednisolone 1% ophth. drops(2/5/19)    Seafood Nausea    Topiramate     Unknown [No Clinical Screening - See Comments]      VLORIC     Doxycycline Rash        Physical Exam   Vital Signs: Temp: 98.5  F (36.9  C) Temp src: Tympanic BP: 104/72 Pulse: 89   Resp: 16 SpO2: 97 % O2 Device: None (Room air)    Weight: 0 lbs 0 oz    Constitutional: awake, alert, cooperative, no apparent distress, and appears stated age  ENT: Normocephalic, without obvious abnormality, atraumatic, sinuses nontender on palpation, external ears without lesions, oral pharynx with moist mucous membranes, tonsils without erythema or exudates, gums normal and good dentition.  Respiratory: No increased work of breathing, good air exchange, clear to auscultation bilaterally,  no crackles or wheezing  Cardiovascular: Normal apical impulse, regular rate and rhythm, normal S1 and S2, no S3 or S4, and no murmur noted  GI: No scars, normal bowel sounds, soft, non-distended, non-tender, no masses palpated, no hepatosplenomegally  Skin: no bruising or bleeding  Musculoskeletal: Lower extremities are without edema.  She is status post a right total knee arthroplasty scar noted.  Left knee is more swollen.  Is a mild effusion.  She is tender to the lateral portion of her knee.  Neuro: Alert and oriented x 3 cranial nerves are intact she does have some oral facial movements.  Strength is equal throughout.    Medical Decision Making       65 MINUTES SPENT BY ME on the date of service doing chart review, history, exam, documentation & further activities per the note.      Data         Imaging results reviewed over the past 24 hrs:   Recent Results (from the past 24 hour(s))   XR Knee Left 3 Views    Narrative    PROCEDURE:  XR KNEE LEFT 3 VIEWS    HISTORY: left knee pain    COMPARISON: None    TECHNIQUE:  XR KNEE LEFT 3 VIEWS    FINDINGS:    No acute fracture or dislocation is identified. No suspicious osseous  lesion. Advanced medial tibiofemoral compartment joint space  narrowing. Additional mild to moderate periarticular degenerative  changes. Small joint effusion.    No foreign body or subcutaneous emphysema. Arterial vascular  calcifications.        Impression    IMPRESSION:    Degenerative changes without acute osseous abnormality.    HUBER SHEEHAN MD         SYSTEM ID:  [id number]

## 2024-10-02 NOTE — PLAN OF CARE
"[facility] Observation Note:  Patient is registered to observation and is in 3218/3218-1 at approximately 1600 via wheel chair from Urgent Care. Report received from DORIS Stockton in SBAR format at 1545 via telephone. Patient transferred to bed via Assist x2. Patient is alert and oriented X 3, reports pain; rates at 3 on 0-10 scale.  Patient oriented to room, unit, hourly rounding, and plan of care. Explained the admission packet including \"What is Observation Status\" and patient handbook with patient bill of rights brochure. Will continue to monitor and document as needed.  Nursing Observation criteria listed below was met:  Health care directives status obtained and documented: Yes  Care Everywhere authorization completed Yes    MRSA swab completed for patient 65 years and older: No    If initial lactic acid greater than 2.0, repeat lactic acid drawn within one hour of arrival to unit: NA    Patient identifies a surrogate decision maker: Yes If yes, who:Jodi Contreras RN Contact Information:see facesheet      Skin issues/needs documented:Yes  Isolation Patient: no Education given and documented, correct sign in place and documentation row added to PCS:  No    Fall Prevention: Observation fall risk completed:  Yes Education given and documented, sticker and magnet in place: Yes    General Care Plan initiated with observation goal(s): Yes  Education (including assessment) Documented: Yes  New medication information given to patient and documented: Yes  Patient elects to use own medications from home during hospitalization:  No If yes, a MD order was obtained to use Medications from Home:   no  and home medications were sent to Pharmacy for verification for use during hospitalization: Yes  Patient has discharge needs (If yes, please explain): Yes     "

## 2024-10-02 NOTE — ED PROVIDER NOTES
History     Chief Complaint   Patient presents with    Knee Pain     HPI  Nishi Santoyo is a 74 year old female who presents to urgent care today via EMS in wheelchair with complaints of left knee pain.  Patient states that knee pain started when she was getting off the toilet to stand up earlier in the day.  Decreased ROM to left knee.  No previous fracture or surgery to left knee.  Denies any fever, chills, nausea, vomiting, diarrhea, shortness of breath or chest pain.  No OTC meds.  Patient had a fall at home on 8/21/2024 resulting in a right distal radius fracture, splint in place, states she has not followed up with orthopedics, no acute concerns regarding right upper extremity at this time.  Has a home health nurse that comes twice a week but patient states the plan was for them to start coming daily.  No OTC meds.  No other concerns.    Allergies:  Allergies   Allergen Reactions    Aspirin GI Disturbance    Clarithromycin GI Disturbance     Biaxin    Gabapentin      Neurontin; not listed on [facility] 1/11/19    Ibuprofen GI Disturbance    Lisinopril GI Disturbance    Metformin Diarrhea    Minocycline      Passed out    Oxycodone Hcl      OxyContin; Not listed on [facility] 1/11/19    Prednisone Swelling     Can't recall, says it wasn't severe or I would have remembered; cannot recall what was swelling-facial cheeks  Provider wants to try the prednisolone 1% ophth. drops(2/5/19)    Seafood Nausea    Topiramate     Unknown [No Clinical Screening - See Comments]      VLORIC     Doxycycline Rash       Problem List:    Patient Active Problem List    Diagnosis Date Noted    Unstable gait 10/02/2024     Priority: Medium    Generalized weakness 10/02/2024     Priority: Medium    Left knee pain 10/02/2024     Priority: Medium    DM2 (diabetes mellitus, type 2) (H) 11/18/2014     Priority: Medium    Hypertension 11/18/2014     Priority: Medium    Rheumatoid arthritis (H) 11/18/2014     Priority: Medium     Generalized osteoarthritis 06/11/2004     Priority: Medium    Gastroesophageal reflux disease 06/11/2004     Priority: Medium        Past Medical History:    Past Medical History:   Diagnosis Date    Arthritis     Diabetes (H)     Hypertension     Obese     Renal disease        Past Surgical History:    Past Surgical History:   Procedure Laterality Date    BACK SURGERY  2014    spine fusion surgery    BREAST SURGERY  1980    bilateral breast reductions    CHOLECYSTECTOMY      COLONOSCOPY  08/04/2017    ORTHOPEDIC SURGERY  2003    left total hip    ORTHOPEDIC SURGERY  2004    right total knee    ORTHOPEDIC SURGERY  2005    right total shoulder    ORTHOPEDIC SURGERY  2006    finger and hand joint replacements    PHACOEMULSIFICATION WITH STANDARD INTRAOCULAR LENS IMPLANT Left 3/7/2019    Procedure: CATARACT EXTRACTION WITH IMPLANT LEFT;  Surgeon: Jorge Rosa MD;  Location: [facility]    PHACOEMULSIFICATION WITH STANDARD INTRAOCULAR LENS IMPLANT Right 4/4/2019    Procedure: CATARACT RIGHT EYE WITH IMPLANT;  Surgeon: Jorge Rosa MD;  Location: [facility]       Family History:    No family history on file.    Social History:  Marital Status:   [4]  Social History     Tobacco Use    Smoking status: Every Day    Smokeless tobacco: Never   Substance Use Topics    Alcohol use: Yes        Medications:    ALPRAZolam (XANAX) 1 MG tablet  atorvastatin (LIPITOR) 20 MG tablet  Cholecalciferol (VITAMIN D3) 2000 units CAPS  collagenase (SANTYL) 250 UNIT/GM external ointment  denosumab (PROLIA) 60 MG/ML SOLN injection  hemp seed oil  hydrochlorothiazide (HYDRODIURIL) 12.5 MG tablet  levothyroxine (SYNTHROID/LEVOTHROID) 50 MCG tablet  magnesium chloride 535 (64 Mg) MG TBEC CR tablet  metoprolol succinate ER (TOPROL-XL) 50 MG 24 hr tablet  mometasone (ELOCON) 0.1 % external cream  mupirocin (BACTROBAN) 2 % external ointment  nystatin (MYCOSTATIN) 215164 UNIT/GM external powder  omeprazole (PRILOSEC) 40 MG   capsule  oxyCODONE (OXYCONTIN) 10 MG 12 hr tablet  prochlorperazine (COMPAZINE) 10 MG tablet  solifenacin (VESICARE) 5 MG tablet  SUMAtriptan (IMITREX) 100 MG tablet  TOLTERODINE TARTRATE PO      Review of Systems   Constitutional:  Negative for chills and fever.   Respiratory:  Negative for shortness of breath.    Cardiovascular:  Negative for chest pain.   Gastrointestinal:  Negative for diarrhea, nausea and vomiting.   Musculoskeletal:  Positive for gait problem (left knee pain with ambulation).        Left knee pain   Skin: Negative.    Psychiatric/Behavioral: Negative.       Physical Exam   BP: 104/72  Pulse: 89  Temp: 98.5  F (36.9  C)  Resp: 16  SpO2: 97 %    Physical Exam  Vitals and nursing note reviewed.   Constitutional:       General: She is not in acute distress.     Appearance: Normal appearance. She is not ill-appearing or toxic-appearing.   Cardiovascular:      Rate and Rhythm: Normal rate and regular rhythm.      Pulses: Normal pulses.      Heart sounds: Normal heart sounds.   Pulmonary:      Effort: Pulmonary effort is normal.      Breath sounds: Normal breath sounds.   Musculoskeletal:      Left upper leg: Normal.      Left knee: Effusion present. No swelling, deformity, erythema, ecchymosis, lacerations or crepitus. Decreased range of motion. Tenderness present. Normal pulse.      Left lower leg: Normal.      Left ankle: Normal.   Skin:     General: Skin is warm and dry.      Capillary Refill: Capillary refill takes less than 2 seconds.   Neurological:      Mental Status: She is alert.   Psychiatric:         Mood and Affect: Mood normal.       ED Course     Procedures    Results for orders placed or performed during the hospital encounter of 10/02/24 (from the past 24 hour(s))   XR Knee Left 3 Views    Narrative    PROCEDURE:  XR KNEE LEFT 3 VIEWS    HISTORY: left knee pain    COMPARISON: None    TECHNIQUE:  XR KNEE LEFT 3 VIEWS    FINDINGS:    No acute fracture or dislocation is identified. No  suspicious osseous  lesion. Advanced medial tibiofemoral compartment joint space  narrowing. Additional mild to moderate periarticular degenerative  changes. Small joint effusion.    No foreign body or subcutaneous emphysema. Arterial vascular  calcifications.        Impression    IMPRESSION:    Degenerative changes without acute osseous abnormality.    HUBER SHEEHAN MD         SYSTEM ID:  [id number]       Medications   acetaminophen (TYLENOL) tablet 650 mg (650 mg Oral $Given 10/2/24 1876)       Assessments & Plan (with Medical Decision Making)     I have reviewed the nursing notes.    I have reviewed the findings, diagnosis, plan and need for follow up with the patient.  (M25.562) Left knee pain  (primary encounter diagnosis)  Plan: Orthopedic  Referral  (R26.81) Unstable gait  (R53.1) Generalized weakness  Plan: Orthopedic  Referral  Patient arrived via EMS in wheelchair with complaints of left knee pain that started after patient was standing up getting off the toilet earlier today.  Denies falling.  Denies previous fracture or surgery to left knee.  Decreased ROM.  Denies any fever, chills, nausea, vomiting, diarrhea, shortness of breath or chest pain.  X-ray shows degenerative changes without acute osseous abnormality.  Small joint effusion.  Plan was for patient to follow RICE, ace wrap was applied and tylenol and for pain and follow up with orthopedics. Patient then states she is unable to care for herself and lives alone.  She also has steps going into her home which she is worried about as well.  PT consult was placed and they came to see patient and agreed patient is not able to go home today to care for herself independently.   María Elena Cooley into see patient and plan is to have patient go to [facility] tomorrow.  Telephone call to hospitalist Dr. Rosa who kindly accepted patient for observation with plan to discharge to [facility] tomorrow.  Patient was  then transferred to the medical floor.    New Prescriptions    No medications on file     Final diagnoses:   Left knee pain   Unstable gait   Generalized weakness     10/2/2024   [facility]       Selina Ledezma NP  10/02/24 1800

## 2024-10-02 NOTE — DISCHARGE INSTRUCTIONS
Rest  Ice  Compression with ace wrap  Elevate  Tylenol for pain  Follow up with orthopedics for further evaluation if no improvement with above  Follow up with primary care provider or return to urgent care/ED with any worsening in condition or additional concerns.    To get better and follow your care plan as instructed.

## 2024-10-02 NOTE — ED TRIAGE NOTES
"Pt presents with concerns of left knee pain. Pt states \"I blew my knee out yesterday\" incident occurred while pt was getting off the toilet. Pt reports home care present twice weekly.         "

## 2024-10-03 ENCOUNTER — APPOINTMENT (OUTPATIENT)
Dept: OCCUPATIONAL THERAPY | Facility: HOSPITAL | Age: 74
End: 2024-10-03
Attending: INTERNAL MEDICINE
Payer: COMMERCIAL

## 2024-10-03 ENCOUNTER — APPOINTMENT (OUTPATIENT)
Dept: PHYSICAL THERAPY | Facility: HOSPITAL | Age: 74
End: 2024-10-03
Payer: COMMERCIAL

## 2024-10-03 LAB
ANION GAP SERPL CALCULATED.3IONS-SCNC: 13 MMOL/L (ref 7–15)
BASOPHILS # BLD AUTO: 0 10E3/UL (ref 0–0.2)
BASOPHILS NFR BLD AUTO: 1 %
BUN SERPL-MCNC: 8.6 MG/DL (ref 8–23)
CALCIUM SERPL-MCNC: 9.1 MG/DL (ref 8.8–10.4)
CHLORIDE SERPL-SCNC: 101 MMOL/L (ref 98–107)
CREAT SERPL-MCNC: 0.77 MG/DL (ref 0.51–0.95)
EGFRCR SERPLBLD CKD-EPI 2021: 80 ML/MIN/1.73M2
EOSINOPHIL # BLD AUTO: 0.1 10E3/UL (ref 0–0.7)
EOSINOPHIL NFR BLD AUTO: 2 %
ERYTHROCYTE [DISTWIDTH] IN BLOOD BY AUTOMATED COUNT: 14.4 % (ref 10–15)
GLUCOSE SERPL-MCNC: 117 MG/DL (ref 70–99)
HCO3 SERPL-SCNC: 28 MMOL/L (ref 22–29)
HCT VFR BLD AUTO: 45.9 % (ref 35–47)
HGB BLD-MCNC: 15.7 G/DL (ref 11.7–15.7)
IMM GRANULOCYTES # BLD: 0 10E3/UL
IMM GRANULOCYTES NFR BLD: 0 %
LYMPHOCYTES # BLD AUTO: 1.8 10E3/UL (ref 0.8–5.3)
LYMPHOCYTES NFR BLD AUTO: 30 %
MCH RBC QN AUTO: 32 PG (ref 26.5–33)
MCHC RBC AUTO-ENTMCNC: 34.2 G/DL (ref 31.5–36.5)
MCV RBC AUTO: 94 FL (ref 78–100)
MONOCYTES # BLD AUTO: 0.7 10E3/UL (ref 0–1.3)
MONOCYTES NFR BLD AUTO: 12 %
NEUTROPHILS # BLD AUTO: 3.4 10E3/UL (ref 1.6–8.3)
NEUTROPHILS NFR BLD AUTO: 56 %
NRBC # BLD AUTO: 0 10E3/UL
NRBC BLD AUTO-RTO: 0 /100
PLATELET # BLD AUTO: 197 10E3/UL (ref 150–450)
POTASSIUM SERPL-SCNC: 3 MMOL/L (ref 3.4–5.3)
POTASSIUM SERPL-SCNC: 3.9 MMOL/L (ref 3.4–5.3)
RBC # BLD AUTO: 4.9 10E6/UL (ref 3.8–5.2)
SARS-COV-2 RNA RESP QL NAA+PROBE: NEGATIVE
SODIUM SERPL-SCNC: 142 MMOL/L (ref 135–145)
WBC # BLD AUTO: 6.2 10E3/UL (ref 4–11)

## 2024-10-03 PROCEDURE — 36415 COLL VENOUS BLD VENIPUNCTURE: CPT | Performed by: HOSPITALIST

## 2024-10-03 PROCEDURE — 99233 SBSQ HOSP IP/OBS HIGH 50: CPT | Mod: 24 | Performed by: HOSPITALIST

## 2024-10-03 PROCEDURE — 97165 OT EVAL LOW COMPLEX 30 MIN: CPT | Mod: GO

## 2024-10-03 PROCEDURE — 97530 THERAPEUTIC ACTIVITIES: CPT | Mod: GP

## 2024-10-03 PROCEDURE — 80048 BASIC METABOLIC PNL TOTAL CA: CPT | Performed by: HOSPITALIST

## 2024-10-03 PROCEDURE — 85025 COMPLETE CBC W/AUTO DIFF WBC: CPT | Performed by: HOSPITALIST

## 2024-10-03 PROCEDURE — 84132 ASSAY OF SERUM POTASSIUM: CPT | Performed by: HOSPITALIST

## 2024-10-03 PROCEDURE — G0378 HOSPITAL OBSERVATION PER HR: HCPCS

## 2024-10-03 PROCEDURE — 96372 THER/PROPH/DIAG INJ SC/IM: CPT | Performed by: HOSPITALIST

## 2024-10-03 PROCEDURE — 250N000013 HC RX MED GY IP 250 OP 250 PS 637: Performed by: HOSPITALIST

## 2024-10-03 PROCEDURE — 250N000011 HC RX IP 250 OP 636: Performed by: HOSPITALIST

## 2024-10-03 PROCEDURE — 250N000013 HC RX MED GY IP 250 OP 250 PS 637: Performed by: INTERNAL MEDICINE

## 2024-10-03 PROCEDURE — 87635 SARS-COV-2 COVID-19 AMP PRB: CPT | Performed by: HOSPITALIST

## 2024-10-03 RX ORDER — ATORVASTATIN CALCIUM 20 MG/1
20 TABLET, FILM COATED ORAL EVERY EVENING
Status: DISCONTINUED | OUTPATIENT
Start: 2024-10-03 | End: 2024-10-04 | Stop reason: HOSPADM

## 2024-10-03 RX ORDER — POTASSIUM CHLORIDE 1500 MG/1
20 TABLET, EXTENDED RELEASE ORAL ONCE
Status: COMPLETED | OUTPATIENT
Start: 2024-10-03 | End: 2024-10-03

## 2024-10-03 RX ORDER — ENOXAPARIN SODIUM 100 MG/ML
40 INJECTION SUBCUTANEOUS EVERY 24 HOURS
Status: DISCONTINUED | OUTPATIENT
Start: 2024-10-03 | End: 2024-10-04 | Stop reason: HOSPADM

## 2024-10-03 RX ORDER — POTASSIUM CHLORIDE 1500 MG/1
40 TABLET, EXTENDED RELEASE ORAL ONCE
Status: COMPLETED | OUTPATIENT
Start: 2024-10-03 | End: 2024-10-03

## 2024-10-03 RX ORDER — METOPROLOL SUCCINATE 50 MG/1
50 TABLET, EXTENDED RELEASE ORAL DAILY
Status: DISCONTINUED | OUTPATIENT
Start: 2024-10-03 | End: 2024-10-04 | Stop reason: HOSPADM

## 2024-10-03 RX ADMIN — POTASSIUM CHLORIDE 40 MEQ: 1500 TABLET, EXTENDED RELEASE ORAL at 08:21

## 2024-10-03 RX ADMIN — TOLTERODINE 4 MG: 4 CAPSULE, EXTENDED RELEASE ORAL at 09:43

## 2024-10-03 RX ADMIN — LEVOTHYROXINE SODIUM 50 MCG: 0.03 TABLET ORAL at 08:21

## 2024-10-03 RX ADMIN — ATORVASTATIN CALCIUM 20 MG: 20 TABLET, FILM COATED ORAL at 20:56

## 2024-10-03 RX ADMIN — METOPROLOL SUCCINATE 50 MG: 50 TABLET, EXTENDED RELEASE ORAL at 14:01

## 2024-10-03 RX ADMIN — AMITRIPTYLINE HYDROCHLORIDE 25 MG: 25 TABLET, FILM COATED ORAL at 20:57

## 2024-10-03 RX ADMIN — ENOXAPARIN SODIUM 40 MG: 40 INJECTION SUBCUTANEOUS at 14:01

## 2024-10-03 RX ADMIN — POTASSIUM CHLORIDE 20 MEQ: 1500 TABLET, EXTENDED RELEASE ORAL at 10:21

## 2024-10-03 ASSESSMENT — ACTIVITIES OF DAILY LIVING (ADL)
ADLS_ACUITY_SCORE: 37
ADLS_ACUITY_SCORE: 35
ADLS_ACUITY_SCORE: 37
ADLS_ACUITY_SCORE: 40
ADLS_ACUITY_SCORE: 35
ADLS_ACUITY_SCORE: 40
ADLS_ACUITY_SCORE: 40
ADLS_ACUITY_SCORE: 37
ADLS_ACUITY_SCORE: 35
ADLS_ACUITY_SCORE: 37

## 2024-10-03 NOTE — PROGRESS NOTES
[facility]    Medicine Progress Note - Hospitalist Service    Date of Admission:  10/2/2024    Assessment & Plan   Patient  is a 74 year old female with established diagnosis of Left knee pain, osteoarthritis of that left knee hypertension, Tobacco use disorder, History of rheumatoid arthritis.  Right radial fx, Hypothyroidism, Hypokalemia, Hyperlipidemia. She was admitted on 10/2/2024.  Patient admitted to hospital after she developed sharp  pain in left knee. She had xray done no fx. Mild effusion present. No sings of infection. She also did have prior right  wrist fx (distal radius fracture) with cast in place and sling . She was a fall risk.  It was felt she will need rehab.  1.  Left knee pain:   osteoarthritis of that left knee  Pain control  Pt and ot  May need placement rehab  Out patient orthopedic follow up, prefers  [facility]    2.  History of hypertension:   Monitor   Home metoprolol     3.  Tobacco use disorder:   Prn nicotine patch     4.  History of rheumatoid arthritis.    Not on any meds, no DMARDS   No flare    Right radial fx  Has cast on    Hypothyroidism  Home synthroid    Hypokalemia  Replace per protocol     Hyperlipidemia  Home  statin         Observation Goals: -diagnostic tests and consults completed and resulted, -vital signs normal or at patient baseline, -adequate pain control on oral analgesics, -safe disposition plan has been identified, Nurse to notify provider when observation goals have been met and patient is ready for discharge.  Diet: Regular Diet Adult    DVT Prophylaxis: Enoxaparin (Lovenox) SQ  Edge Catheter: Not present  Lines: None     Cardiac Monitoring: None  Code Status: Full Code      Clinically Significant Risk Factors Present on Admission        # Hypokalemia: Lowest K = 3 mmol/L in last 2 days, will replace as needed           # Hypertension: Noted on problem list         # Obesity: Estimated body mass index is 32.76 kg/m  as calculated from the following:     "Height as of this encounter: 1.651 m (5' 5\").    Weight as of this encounter: 89.3 kg (196 lb 13.9 oz).              Disposition Plan     Medically Ready for Discharge: Anticipated in 2-4 Days             Sawyer Arnold DO  Hospitalist Service  [facility]  Securely message with [employer] (more info)  Text page via [facility] Paging/Directory   ______________________________________________________________________    Interval History   Patient was seen this morning for medical  rounds.  No chest pain or  shortness of breath,.     Physical Exam   Vital Signs: Temp: 97.5  F (36.4  C) Temp src: Tympanic BP: 148/92 Pulse: 83   Resp: 16 SpO2: 94 % O2 Device: None (Room air)    Weight: 196 lbs 13.93 oz    Physical Exam  Constitutional:       Comments: Frail appearing stated age female    HENT:      Right Ear: External ear normal.      Left Ear: External ear normal.      Nose: Nose normal.      Mouth/Throat:      Pharynx: Oropharynx is clear.   Eyes:      Conjunctiva/sclera: Conjunctivae normal.   Cardiovascular:      Rate and Rhythm: Normal rate.   Pulmonary:      Effort: Pulmonary effort is normal.   Abdominal:      General: Abdomen is flat.   Musculoskeletal:         General: No swelling.      Comments: Right arm cast and left knee bandage present    Skin:     Coloration: Skin is not jaundiced.   Neurological:      Mental Status: She is alert. Mental status is at baseline.         Medical Decision Making       62 MINUTES SPENT BY ME on the date of service doing chart review, history, exam, documentation & further activities per the note.      Data     I have personally reviewed the following data over the past 24 hrs:    6.2  \   15.7   / 197     142 101 8.6 /  117 (H)   3.0 (L) 28 0.77 \     ALT: 11 AST: 17 AP: 105 TBILI: 0.9   ALB: 3.6 TOT PROTEIN: 6.6 LIPASE: N/A       Imaging results reviewed over the past 24 hrs:   Recent Results (from the past 24 hour(s))   XR Knee Left 3 Views    Narrative    PROCEDURE: "  XR KNEE LEFT 3 VIEWS    HISTORY: left knee pain    COMPARISON: None    TECHNIQUE:  XR KNEE LEFT 3 VIEWS    FINDINGS:    No acute fracture or dislocation is identified. No suspicious osseous  lesion. Advanced medial tibiofemoral compartment joint space  narrowing. Additional mild to moderate periarticular degenerative  changes. Small joint effusion.    No foreign body or subcutaneous emphysema. Arterial vascular  calcifications.        Impression    IMPRESSION:    Degenerative changes without acute osseous abnormality.    HUBER SHEEHAN MD         SYSTEM ID:  [id number]

## 2024-10-03 NOTE — PLAN OF CARE
Goal Outcome Evaluation:      Plan of Care Reviewed With: patient    Overall Patient Progress: improving  Overall Patient Progress: improving    Patient A+O x4 - calls appropriately and makes needs known. Denied any pain while in bed and resting. States slept well last night. CMS remains intact to RUE and LLE. Splint and shoulder sling in place to RUE. Assist 2 for pivot transfers. No IV access.     VS and rest of assessment as charted in flow sheets.         Face to face report given with opportunity to observe patient.    Report given to Zari Crawford RN   10/3/2024  7:30 AM

## 2024-10-03 NOTE — PLAN OF CARE
Plan of Care Reviewed With: Patient     Overall Patient Progress:           Pt A&O, VS and assessment as charted, afebrile. Rates Left knee pain with movement 3/10. Assist x2 pivot to bedside commode. Cast to right wrist, CMS intact. Tolerating regular diet, able to make needs known, call light in reach.      Face to face report given with opportunity to observe patient.    Report given to DORIS Jensen RN   10/2/2024  7:44 PM

## 2024-10-03 NOTE — PLAN OF CARE
Plan of Care Reviewed With: Nishi, patient    Overall Patient Progress: Progressing    Patient is alert, oriented, and makes needs known. Denied pain throughout shift. Splint and shoulder sling in place to RUE. VS and assessment as charted. Assist of 1 for pivot transfers. No IV access. Plan to discharge tomorrow to [facility]. Call light within reach.     Face to face report given with opportunity to observe patient.    Report given to DORIS Pearson RN   10/3/2024  6:42 PM

## 2024-10-03 NOTE — PROGRESS NOTES
PAS-RR    Per DHS regulation, CTS team completed and submitted PAS-RR to [place] [employer] via the [employer]. CTS team advised SNF and they are aware a PAS-RR has been submitted.     CTS team reviewed with pt or health care agent that they may be contacted for a follow up appointment within 10 days of hospital discharge if SNF stay is less than 30 days. Contact information for [employer] was also provided.     Pt or health care agent verbalized understanding.     PAS-RR # [id number]    [facility] was the only facility that Nishi wanted to go to.    Nhi Smith CM

## 2024-10-03 NOTE — PROGRESS NOTES
Care Transitions focused note:      Sent requested updated notes and H&P. [facility] ride at 0900 with [facility] [employer].    Nhi Smith CM

## 2024-10-03 NOTE — PROGRESS NOTES
10/03/24 1300   Appointment Info   Signing Clinician's Name / Credentials (PT) Christelle Shepherd DPT   Interventions   Interventions Quick Adds Therapeutic Activity   Therapeutic Activity   Therapeutic Activities: dynamic activities to improve functional performance Minutes (72540) 23   Symptoms Noted During/After Treatment Fatigue;Increased pain   Treatment Detail/Skilled Intervention Pt resting in bed at start of session and agreeable to PT. Completed supine to sit transfer slowly but independently. Attempted sit to stand c cane on L however pt unable due to severe pain with WB on LLE. Agreeable to attempt pivot to R to recliner. Took extra time and min to mod A x2 for pivot to recliner without AD. Once in recliner assisted pt with scooting back in chair. Unable to tolerate more today due to c/o severe knee pain. Resting in recliner at end of session c personal alarm on   PT Discharge Planning   PT Plan progress mobility as tolerated   PT Discharge Recommendation (DC Rec) Transitional Care Facility   PT Rationale for DC Rec Pt is not safe to return home at this time and will require short term rehab upon discharge. Will see during stay to progress mobility as able   PT Brief overview of current status min-mod A x2 pivot transfer, unable to ambulate

## 2024-10-03 NOTE — PROGRESS NOTES
10/03/24 0900   Appointment Info   Signing Clinician's Name / Credentials (OT) Mini Gauthier, OTR/L   Quick Adds   Quick Adds Certification   Living Environment   People in Home alone   Current Living Arrangements house   Home Accessibility stairs to enter home   Number of Stairs, Main Entrance 5   Stair Railings, Main Entrance railing on right side (ascending)   Transportation Anticipated family or friend will provide;car, drives self   Living Environment Comments Patient was living independently at baseline and was receiving home care services 2x/week. Bathroom has a walk-in tub with grab bars.   Self-Care   Usual Activity Tolerance fair   Current Activity Tolerance poor   Equipment Currently Used at Home cane, straight   Fall history within last six months yes   Number of times patient has fallen within last six months 2   Activity/Exercise/Self-Care Comment Patient had a fall in August that resulted in an injury to her RUE and she has been requiring increased assistance with dressing and bathing since this injury. She has home care 2x/week. She was walking with a single end cane at baseline. She reported that toileting was hard for her at home.   Instrumental Activities of Daily Living (IADL)   IADL Comments Patient was receiving assistance with IADLs.   General Information   Onset of Illness/Injury or Date of Surgery 10/02/24   Referring Physician Marcos Rosa   Additional Occupational Profile Info/Pertinent History of Current Problem Patient presentd to the hospital with unstable gait, left knee pain, and generalized weakness.   Left Upper Extremity (Weight-bearing Status) full weight-bearing (FWB)   Right Upper Extremity (Weight-bearing Status) non weight-bearing (NWB)   Left Lower Extremity (Weight-bearing Status) full weight-bearing (FWB)   Right Lower Extremity (Weight-bearing Status) full weight-bearing (FWB)   General Observations and Info Patient is currently in a shoulder sling and has a cast on  her R wrist/forearm. She is NWB in RUE. Patient is L hand dominant.   Cognitive Status Examination   Orientation Status orientation to person, place and time   Pain Assessment   Patient Currently in Pain Yes, see Vital Sign flowsheet   Range of Motion Comprehensive   Comment, General Range of Motion LUE strength WFL; RUE shoulder in shoulder sling   Transfers   Transfers toilet transfer;sit-stand transfer   Sit-Stand Transfer   Sit-Stand [place] (Transfers) minimum assist (75% patient effort);2 person assist   Sit/Stand Transfer Comments Patient stood from commode with verbal cues and min A x 2.   Toilet Transfer   Type (Toilet Transfer) stand pivot/stand step   [place] Level (Toilet Transfer) minimum assist (75% patient effort);2 person assist;verbal cues   Toilet Transfer Comments Patient transferred from commode to bed with min A x 2 and verbal cues.   Activities of Daily Living   BADL Assessment/Intervention lower body dressing;toileting;feeding   Lower Body Dressing Assessment/Training   [place] Level (Lower Body Dressing) doff;don;socks;maximum assist (25% patient effort)   Eating/Self Feeding   Comment, (Feeding) Patient required assistance to set up food and open containers.   Toileting   Comment, (Toileting) Patient required max A for toileting hygiene and management of clothing.   [place] Level (Toileting) toileting skills;adjust/manage clothing;perform perineal hygiene   Clinical Impression   Criteria for Skilled Therapeutic Interventions Met (OT) Yes, treatment indicated   OT Diagnosis Impaired ADLs and mobility   Influenced by the following impairments Unstable gait; Left knee pain; Generalized weakness   OT Problem List-Impairments impacting ADL problems related to;activity tolerance impaired;range of motion (ROM);mobility;pain;strength   Assessment of Occupational Performance 1-3 Performance Deficits   Identified Performance Deficits Impaired ADLs and mobility   Planned Therapy  Interventions (OT) ADL retraining;risk factor education;progressive activity/exercise;home program guidelines;ROM;strengthening;stretching   Clinical Decision Making Complexity (OT) problem focused assessment/low complexity   Risk & Benefits of therapy have been explained evaluation/treatment results reviewed;care plan/treatment goals reviewed;risks/benefits reviewed;current/potential barriers reviewed;participants voiced agreement with care plan;participants included;patient   Clinical Impression Comments Patient was previously living alone and was receiving assistance with ADLs and IADLs. She was using a cane for mobility. During evaluation, patient transferred with Ax2 and required assistance for lower body dressing and toileting tasks.   OT Total Evaluation Time   OT Eval, Low Complexity Minutes (75930) 14   Therapy Certification   Medical Diagnosis Unstable gait   Start of Care Date 10/03/24   Certification date from 10/03/24   Certification date to 10/17/24   OT Goals   Therapy Frequency (OT) 5 times/week   OT Predicted Duration/Target Date for Goal Attainment 10/17/24   OT Goals Lower Body Dressing;Toilet Transfer/Toileting;Upper Body Dressing   OT: Upper Body Dressing Supervision/stand-by assist   OT: Lower Body Dressing Minimal assist   OT: Toilet Transfer/Toileting Supervision/stand-by assist   OT Discharge Planning   OT Plan Progress ADLs and mobility; Provide education on adaptive strategies for dressing tasks   OT Discharge Recommendation (DC Rec) Transitional Care Facility   OT Rationale for DC Rec Patient was previously living alone and was receiving assistance with ADLs and IADLs. She was using a cane for mobility. During evaluation, patient transferred with Ax2 and required assistance for lower body dressing and toileting tasks. She is not safe to return home at this time. Recommend STR.   OT Brief overview of current status Max A for lower body dressing and toileting; A x 2 for stand pivot  transfers   Total Session Time   Total Session Time (sum of timed and untimed services) 14   Psychosocial Support   Trust Relationship/Rapport care explained;choices provided;emotional support provided;empathic listening provided;questions answered;questions encouraged;reassurance provided;thoughts/feelings acknowledged   I certify the need for these services furnished under this plan of treatment and while under my care. (Physician co-signature of this document indicates review and certification of the therapy plan).      _____________________________     __________________________    ____________  Physician's Signature                 Date               Time

## 2024-10-04 VITALS
BODY MASS INDEX: 32.8 KG/M2 | SYSTOLIC BLOOD PRESSURE: 115 MMHG | RESPIRATION RATE: 18 BRPM | DIASTOLIC BLOOD PRESSURE: 74 MMHG | OXYGEN SATURATION: 94 % | HEIGHT: 65 IN | HEART RATE: 56 BPM | TEMPERATURE: 97.6 F | WEIGHT: 196.87 LBS

## 2024-10-04 LAB
HOLD SPECIMEN: NORMAL
POTASSIUM SERPL-SCNC: 3.6 MMOL/L (ref 3.4–5.3)

## 2024-10-04 PROCEDURE — 250N000013 HC RX MED GY IP 250 OP 250 PS 637: Performed by: INTERNAL MEDICINE

## 2024-10-04 PROCEDURE — 99239 HOSP IP/OBS DSCHRG MGMT >30: CPT | Mod: 24 | Performed by: HOSPITALIST

## 2024-10-04 PROCEDURE — G0378 HOSPITAL OBSERVATION PER HR: HCPCS

## 2024-10-04 PROCEDURE — 36415 COLL VENOUS BLD VENIPUNCTURE: CPT | Performed by: HOSPITALIST

## 2024-10-04 PROCEDURE — 84132 ASSAY OF SERUM POTASSIUM: CPT | Performed by: HOSPITALIST

## 2024-10-04 RX ORDER — ACETAMINOPHEN 325 MG/1
650 TABLET ORAL EVERY 4 HOURS PRN
Qty: 90 TABLET | Refills: 0 | Status: SHIPPED | OUTPATIENT
Start: 2024-10-04 | End: 2024-11-03

## 2024-10-04 RX ORDER — OXYCODONE HYDROCHLORIDE 5 MG/1
5 TABLET ORAL EVERY 4 HOURS PRN
Qty: 12 TABLET | Refills: 0 | Status: SHIPPED | OUTPATIENT
Start: 2024-10-04 | End: 2024-10-07

## 2024-10-04 RX ADMIN — TOLTERODINE 4 MG: 4 CAPSULE, EXTENDED RELEASE ORAL at 07:48

## 2024-10-04 RX ADMIN — LEVOTHYROXINE SODIUM 50 MCG: 0.03 TABLET ORAL at 07:48

## 2024-10-04 ASSESSMENT — ACTIVITIES OF DAILY LIVING (ADL)
ADLS_ACUITY_SCORE: 42

## 2024-10-04 NOTE — PLAN OF CARE
Orders for patient to discharge. Patient vitally stable, on RA. Right wrist cast, right sling intact. Denies pain. Staff assisted patient with morning cares, getting dressed. AVS/Facesheet printed and given to [facility] transport staff. Belongings gathered. Nurse report given via phone call. Patient exited via W/C at 0915.

## 2024-10-04 NOTE — DISCHARGE SUMMARY
"[facility]  Hospitalist Discharge Summary      Date of Admission:  10/2/2024  Date of Discharge:  10/4/2024  Discharging Provider: Sawyer Arnold DO  Discharge Service: Hospitalist Service    Discharge Diagnoses    Left knee pain:   Osteoarthritis of that left knee   History of hypertension:   Tobacco use disorder:   History of rheumatoid arthritis.    Right radial fx  Hypothyroidism  Hypokalemia  Hyperlipidemia      Clinically Significant Risk Factors     # Obesity: Estimated body mass index is 32.76 kg/m  as calculated from the following:    Height as of this encounter: 1.651 m (5' 5\").    Weight as of this encounter: 89.3 kg (196 lb 13.9 oz).       Follow-ups Needed After Discharge   Follow-up Appointments     Follow Up and recommended labs and tests      Follow up with [facility] physician.  The following labs/tests are   recommended: ortho recs.             Unresulted Labs Ordered in the Past 30 Days of this Admission       No orders found from 9/2/2024 to 10/3/2024.        These results will be followed up by [facility] Heart DO      Discharge Disposition   Discharged to nursing home  Condition at discharge: Stable    Hospital Course   Patient  is a 74 year old female with established diagnosis of Left knee pain, osteoarthritis of that left knee hypertension, Tobacco use disorder, History of rheumatoid arthritis.  Right radial fx, Hypothyroidism, Hypokalemia, Hyperlipidemia. She was admitted on 10/2/2024.  Patient admitted to hospital after she developed sharp  pain in left knee. She had xray done no fx. Mild effusion present. No sings of infection. She also did have prior right  wrist fx (distal radius fracture) with cast in place and sling . She was a fall risk.  It was felt she will need rehab. PT and  OT was seeing her. She was accepted for rehab. Ortho consult out patient placed    Consultations This Hospital Stay   PHYSICAL THERAPY ADULT IP CONSULT  PHYSICAL THERAPY ADULT IP " CONSULT  OCCUPATIONAL THERAPY ADULT IP CONSULT  CARE MANAGEMENT / SOCIAL WORK IP CONSULT  PHYSICAL THERAPY ADULT IP CONSULT  OCCUPATIONAL THERAPY ADULT IP CONSULT    Code Status   Full Code    Time Spent on this Encounter   I, Sawyer Arnold DO, personally saw the patient today and spent greater than 30 minutes discharging this patient.       Sawyer Arnold DO  [facility]  [address] [place] [id number]  Phone: [phone]  Fax: [phone]  ______________________________________________________________________    Physical Exam   Vital Signs: Temp: 97.6  F (36.4  C) Temp src: Tympanic BP: 115/74 Pulse: 56   Resp: 18 SpO2: 94 % O2 Device: None (Room air)    Weight: 196 lbs 13.93 oz  Constitutional:       Comments: Frail appearing stated age female    HENT:      Right Ear: External ear normal.      Left Ear: External ear normal.      Nose: Nose normal.      Mouth/Throat:      Pharynx: Oropharynx is clear.   Eyes:      Conjunctiva/sclera: Conjunctivae normal.   Cardiovascular:      Rate and Rhythm: Normal rate.   Pulmonary:      Effort: Pulmonary effort is normal.   Abdominal:      General: Abdomen is flat.   Musculoskeletal:         General: No swelling.      Comments: Right arm cast and left knee bandage present    Skin:     Coloration: Skin is not jaundiced.   Neurological:      Mental Status: She is alert. Mental status is at baseline.   Primary Care Physician   Hua Heart    Discharge Orders      Orthopedic  Referral      General info for SNF    Length of Stay Estimate: Short Term Care: Estimated # of Days <30  Condition at Discharge: Stable  Level of care:skilled   Rehabilitation Potential: Good  Admission H&P remains valid and up-to-date: Yes  Recent Chemotherapy: N/A  Use Nursing Home Standing Orders: Yes     Follow Up and recommended labs and tests    Follow up with half-way physician.  The following labs/tests are recommended: ortho recs.     Reason  for your hospital stay    Patient  is a 74 year old female with established diagnosis of Left knee pain, osteoarthritis of that left knee hypertension, Tobacco use disorder, History of rheumatoid arthritis.  Right radial fx, Hypothyroidism, Hypokalemia, Hyperlipidemia. She was admitted on 10/2/2024.  Patient admitted to hospital after she developed sharp  pain in left knee. She had xray done no fx. Mild effusion present. No sings of infection. She also did have prior right  wrist fx (distal radius fracture) with cast in place and sling . She was a fall risk.  It was felt she will need rehab. PT and  OT was seeing her. She was accepted for rehab. Ortho consult out patient placed     Activity - Up with nursing assistance     Physical Therapy Adult Consult    Evaluate and treat as clinically indicated.    Reason:  weakness     Occupational Therapy Adult Consult    Evaluate and treat as clinically indicated.    Reason:  weakness     Fall precautions     Diet    Follow this diet upon discharge: Current Diet:Orders Placed This Encounter      Regular Diet Adult       Significant Results and Procedures   Most Recent 3 CBC's:  Recent Labs   Lab Test 10/03/24  0735 10/02/24  1603 08/21/24  1256   WBC 6.2 10.5 12.6*   HGB 15.7 16.0* 15.6   MCV 94 93 92    239 189     Most Recent 3 BMP's:  Recent Labs   Lab Test 10/04/24  0536 10/03/24  1438 10/03/24  0735 10/02/24  1603 08/21/24  1256   NA  --   --  142 138 134*   POTASSIUM 3.6 3.9 3.0* 3.0* 3.1*   CHLORIDE  --   --  101 101 96*   CO2  --   --  28 23 20*   BUN  --   --  8.6 10.3 9.4   CR  --   --  0.77 0.86 0.89   ANIONGAP  --   --  13 14 18*   DENISSE  --   --  9.1 9.5 9.5   GLC  --   --  117* 110* 126*   ,   Results for orders placed or performed during the hospital encounter of 10/02/24   XR Knee Left 3 Views    Narrative    PROCEDURE:  XR KNEE LEFT 3 VIEWS    HISTORY: left knee pain    COMPARISON: None    TECHNIQUE:  XR KNEE LEFT 3 VIEWS    FINDINGS:    No acute  fracture or dislocation is identified. No suspicious osseous  lesion. Advanced medial tibiofemoral compartment joint space  narrowing. Additional mild to moderate periarticular degenerative  changes. Small joint effusion.    No foreign body or subcutaneous emphysema. Arterial vascular  calcifications.        Impression    IMPRESSION:    Degenerative changes without acute osseous abnormality.    HUBER SHEEHAN MD         SYSTEM ID:  [id number]       Discharge Medications   Current Discharge Medication List        START taking these medications    Details   acetaminophen (TYLENOL) 325 MG tablet Take 2 tablets (650 mg) by mouth every 4 hours as needed for mild pain or other (and adjunct with moderate or severe pain or per patient request).  Qty: 90 tablet, Refills: 0    Associated Diagnoses: Generalized osteoarthritis      oxyCODONE (ROXICODONE) 5 MG tablet Take 1 tablet (5 mg) by mouth every 4 hours as needed for moderate pain (IF pain not managed with non-pharmacological and non-opioid interventions).  Qty: 12 tablet, Refills: 0    Associated Diagnoses: Generalized osteoarthritis           CONTINUE these medications which have NOT CHANGED    Details   amitriptyline (ELAVIL) 25 MG tablet Take 25 mg by mouth at bedtime.      atorvastatin (LIPITOR) 20 MG tablet Take 20 mg by mouth every evening.      BIOTIN PO Take 1 capsule by mouth daily. For hair loss.      levothyroxine (SYNTHROID/LEVOTHROID) 50 MCG tablet Take 50 mcg by mouth every morning.      metoprolol succinate ER (TOPROL-XL) 50 MG 24 hr tablet Take 50 mg by mouth daily      oxyCODONE (OXYCONTIN) 10 MG 12 hr tablet Take 10 mg by mouth 2 times daily as needed for severe pain.      prochlorperazine (COMPAZINE) 10 MG tablet Take 10 mg by mouth 2 times daily as needed for nausea or vomiting.      silver sulfADIAZINE (SILVADENE) 1 % external cream Apply topically daily as needed (irritation under breasts and pannus).      solifenacin (VESICARE) 5 MG tablet Take 5 mg by  mouth daily      SUMAtriptan (IMITREX) 100 MG tablet Take 100 mg by mouth at onset of headache for migraine      tolterodine ER (DETROL LA) 4 MG 24 hr capsule Take 4 mg by mouth daily.           Allergies   Allergies   Allergen Reactions    Seafood Nausea and Vomiting     Pt reports even the smell of seafood triggers severe nausea and vomiting.     Aspirin GI Disturbance    Ibuprofen GI Disturbance    Clarithromycin Nausea and Vomiting and GI Disturbance     Biaxin    Excedrin Extra Strength [Aspirin-Acetaminophen-Caffeine] GI Disturbance    Gabapentin      [facility]    Lisinopril GI Disturbance    Metformin Diarrhea    Minocycline      Passed out    Prednisone Swelling     Can't recall, says it wasn't severe or I would have remembered; cannot recall what was swelling-facial cheeks  Provider wants to try the prednisolone 1% ophth. drops(2/5/19)    Topiramate      Shakes    Unknown [No Clinical Screening - See Comments]      VLORIC     Doxycycline Rash

## 2024-10-04 NOTE — PROGRESS NOTES
Name: Nishi Santoyo    MRN#: [id number]    Reason for Hospitalization: Unstable gait [R26.81]  Generalized weakness [R53.1]  Left knee pain [M25.562]    Discharge Date: 10/4/2024    Patient / Family response to discharge plan: in agreement    Follow-Up Appt: No future appointments.    Other Providers (Care Coordinator, County Services, PCA services etc): Yes: [facility] and [facility]    Discharge Disposition: short-term care facility    Nhi Smith CM

## 2024-10-04 NOTE — PLAN OF CARE
Occupational Therapy Discharge Summary    Reason for therapy discharge:    Discharged to transitional care facility.    Progress towards therapy goal(s). See goals on Care Plan in [facility] electronic health record for goal details.  Goals not met.  Barriers to achieving goals:   discharge from facility.    Therapy recommendation(s):    Continued therapy is recommended.  Rationale/Recommendations:  to improve safety and independence in ADLs.    Goal Outcome Evaluation:

## 2024-10-04 NOTE — PROGRESS NOTES
Physical Therapy Discharge Summary    Reason for therapy discharge:    Discharged to transitional care facility.    Progress towards therapy goal(s). See goals on Care Plan in [facility] electronic health record for goal details.  Goals not met.  Barriers to achieving goals:   discharge from facility.    Therapy recommendation(s):    Continued therapy is recommended.  Rationale/Recommendations:  Decreased tolerance for functional activity.

## 2024-10-04 NOTE — PLAN OF CARE
Goal Outcome Evaluation:      Plan of Care Reviewed With: patient    Overall Patient Progress: improvingOverall Patient Progress: improving  Took over care of this pt at 1930. Assess as charted. Sling to R) wrist and shoulder sling to RUE. Denies pain. NO IV Access.     Face to face report given with opportunity to observe patient.    Report given to Dee Dee Chew RN   10/3/2024  11:18 PM

## 2024-10-04 NOTE — PLAN OF CARE
Goal Outcome Evaluation:      Plan of Care Reviewed With: patient    Overall Patient Progress: improvingOverall Patient Progress: improving    A/O, VSS, Denies pain. Assessment as charted. No iv access, splint and sling to right arm intact, no c/o. Left knee swelling, no c/o pain. Face to face report given with opportunity to observe patient.    Report given to Amy Moulton RN   10/4/2024  7:32 AM

## 2024-10-24 ENCOUNTER — MEDICAL CORRESPONDENCE (OUTPATIENT)
Dept: MRI IMAGING | Facility: HOSPITAL | Age: 74
End: 2024-10-24

## 2024-11-04 ENCOUNTER — HOSPITAL ENCOUNTER (OUTPATIENT)
Dept: MRI IMAGING | Facility: HOSPITAL | Age: 74
Discharge: HOME | End: 2024-11-04
Attending: FAMILY MEDICINE | Admitting: FAMILY MEDICINE
Payer: COMMERCIAL

## 2024-11-04 DIAGNOSIS — M25.562 LEFT KNEE PAIN: ICD-10-CM

## 2024-11-04 PROCEDURE — 73721 MRI JNT OF LWR EXTRE W/O DYE: CPT | Mod: LT

## (undated) DEVICE — GLV-7.0 PROTEXIS PI CLASSIC LF/PF

## (undated) DEVICE — SENSOR-OXISENSOR II ADULT

## (undated) DEVICE — KNIFE-LASEREDGE CLEAR CORNEAL 2.75MM ANGLED DOUBLE BEVEL

## (undated) DEVICE — PACK-PHACO STELLARIS

## (undated) DEVICE — INSTRUMENT WIPE-VISIWIPE

## (undated) DEVICE — BETADINE 5% STERILE OPHTHALMIC SOLUTION 1 OZ.

## (undated) DEVICE — BIN-TECNIS DCB00 LENSES

## (undated) DEVICE — BIN-CATARACT BIN

## (undated) DEVICE — BIN-LENS IMPLANT CART

## (undated) DEVICE — GLV-7.5 PROTEXIS PI CLASSIC LF/PF

## (undated) DEVICE — DRSG-TEGADERM MEDIUM #1626

## (undated) DEVICE — HANDPIECE-CAPSULEGUARD I/A STELLARIS

## (undated) DEVICE — LABEL-STERILE PREPRINTED FOR OR

## (undated) DEVICE — PACK-EYE-CUSTOM

## (undated) DEVICE — KNIFE-MVR 20GA/45 DEGREE ANGLED

## (undated) DEVICE — CYSTOTOME-IRRIGATING  25G

## (undated) RX ORDER — KETAMINE HCL IN NACL, ISO-OSM 100MG/10ML
SYRINGE (ML) INJECTION
Status: DISPENSED
Start: 2019-03-07

## (undated) RX ORDER — KETAMINE HCL IN NACL, ISO-OSM 100MG/10ML
SYRINGE (ML) INJECTION
Status: DISPENSED
Start: 2019-04-04